# Patient Record
Sex: MALE | Race: WHITE | Employment: OTHER | URBAN - METROPOLITAN AREA
[De-identification: names, ages, dates, MRNs, and addresses within clinical notes are randomized per-mention and may not be internally consistent; named-entity substitution may affect disease eponyms.]

---

## 2017-11-16 ENCOUNTER — HOSPITAL ENCOUNTER (EMERGENCY)
Age: 72
Discharge: HOME | End: 2017-11-16
Attending: EMERGENCY MEDICINE
Payer: MEDICARE

## 2017-11-16 ENCOUNTER — APPOINTMENT (OUTPATIENT)
Dept: GENERAL RADIOLOGY | Age: 72
End: 2017-11-16
Attending: PHYSICIAN ASSISTANT
Payer: MEDICARE

## 2017-11-16 VITALS
RESPIRATION RATE: 16 BRPM | WEIGHT: 135 LBS | TEMPERATURE: 98 F | HEART RATE: 87 BPM | SYSTOLIC BLOOD PRESSURE: 149 MMHG | BODY MASS INDEX: 22.49 KG/M2 | DIASTOLIC BLOOD PRESSURE: 69 MMHG | HEIGHT: 65 IN | OXYGEN SATURATION: 100 %

## 2017-11-16 DIAGNOSIS — M10.9 GOUTY ARTHRITIS: Primary | ICD-10-CM

## 2017-11-16 LAB
ANION GAP SERPL CALC-SCNC: 8 MMOL/L (ref 3–18)
BASOPHILS # BLD: 0 K/UL (ref 0–0.06)
BASOPHILS NFR BLD: 0 % (ref 0–2)
BUN SERPL-MCNC: 33 MG/DL (ref 7–18)
BUN/CREAT SERPL: 21 (ref 12–20)
CALCIUM SERPL-MCNC: 8.6 MG/DL (ref 8.5–10.1)
CHLORIDE SERPL-SCNC: 105 MMOL/L (ref 100–108)
CO2 SERPL-SCNC: 29 MMOL/L (ref 21–32)
CREAT SERPL-MCNC: 1.56 MG/DL (ref 0.6–1.3)
DIFFERENTIAL METHOD BLD: ABNORMAL
EOSINOPHIL # BLD: 0.1 K/UL (ref 0–0.4)
EOSINOPHIL NFR BLD: 1 % (ref 0–5)
ERYTHROCYTE [DISTWIDTH] IN BLOOD BY AUTOMATED COUNT: 13.1 % (ref 11.6–14.5)
GLUCOSE SERPL-MCNC: 222 MG/DL (ref 74–99)
HCT VFR BLD AUTO: 33.8 % (ref 36–48)
HGB BLD-MCNC: 11.4 G/DL (ref 13–16)
LYMPHOCYTES # BLD: 0.9 K/UL (ref 0.9–3.6)
LYMPHOCYTES NFR BLD: 11 % (ref 21–52)
MCH RBC QN AUTO: 31.2 PG (ref 24–34)
MCHC RBC AUTO-ENTMCNC: 33.7 G/DL (ref 31–37)
MCV RBC AUTO: 92.6 FL (ref 74–97)
MONOCYTES # BLD: 1 K/UL (ref 0.05–1.2)
MONOCYTES NFR BLD: 12 % (ref 3–10)
NEUTS SEG # BLD: 6.1 K/UL (ref 1.8–8)
NEUTS SEG NFR BLD: 76 % (ref 40–73)
PLATELET # BLD AUTO: 209 K/UL (ref 135–420)
PMV BLD AUTO: 10 FL (ref 9.2–11.8)
POTASSIUM SERPL-SCNC: 5 MMOL/L (ref 3.5–5.5)
RBC # BLD AUTO: 3.65 M/UL (ref 4.7–5.5)
SODIUM SERPL-SCNC: 142 MMOL/L (ref 136–145)
URATE SERPL-MCNC: 6.2 MG/DL (ref 2.6–7.2)
WBC # BLD AUTO: 8 K/UL (ref 4.6–13.2)

## 2017-11-16 PROCEDURE — 80048 BASIC METABOLIC PNL TOTAL CA: CPT

## 2017-11-16 PROCEDURE — 73130 X-RAY EXAM OF HAND: CPT

## 2017-11-16 PROCEDURE — 74011250637 HC RX REV CODE- 250/637: Performed by: PHYSICIAN ASSISTANT

## 2017-11-16 PROCEDURE — 84550 ASSAY OF BLOOD/URIC ACID: CPT

## 2017-11-16 PROCEDURE — 99283 EMERGENCY DEPT VISIT LOW MDM: CPT

## 2017-11-16 PROCEDURE — 85025 COMPLETE CBC W/AUTO DIFF WBC: CPT

## 2017-11-16 RX ORDER — COLCHICINE 0.6 MG/1
0.6 TABLET ORAL DAILY
Qty: 7 TAB | Refills: 0 | Status: SHIPPED | OUTPATIENT
Start: 2017-11-16 | End: 2017-11-23

## 2017-11-16 RX ORDER — TRAMADOL HYDROCHLORIDE 50 MG/1
50 TABLET ORAL
Qty: 12 TAB | Refills: 0 | Status: SHIPPED | OUTPATIENT
Start: 2017-11-16 | End: 2019-02-01

## 2017-11-16 RX ORDER — NAPROXEN 250 MG/1
250 TABLET ORAL
Status: COMPLETED | OUTPATIENT
Start: 2017-11-16 | End: 2017-11-16

## 2017-11-16 RX ORDER — TRAZODONE HYDROCHLORIDE 50 MG/1
TABLET ORAL
COMMUNITY
End: 2019-02-01

## 2017-11-16 RX ORDER — COLCHICINE 0.6 MG/1
1.2 TABLET ORAL
Status: COMPLETED | OUTPATIENT
Start: 2017-11-16 | End: 2017-11-16

## 2017-11-16 RX ADMIN — NAPROXEN 250 MG: 250 TABLET ORAL at 17:04

## 2017-11-16 RX ADMIN — COLCHICINE 1.2 MG: 0.6 TABLET, FILM COATED ORAL at 17:04

## 2017-11-16 NOTE — DISCHARGE INSTRUCTIONS
Gout: Care Instructions  Your Care Instructions    Gout is a form of arthritis caused by a buildup of uric acid crystals in a joint. It causes sudden attacks of pain, swelling, redness, and stiffness, usually in one joint, especially the big toe. Gout usually comes on without a cause. But it can be brought on by drinking alcohol (especially beer) or eating seafood and red meat. Taking certain medicines, such as diuretics or aspirin, also can bring on an attack of gout. Taking your medicines as prescribed and following up with your doctor regularly can help you avoid gout attacks in the future. Follow-up care is a key part of your treatment and safety. Be sure to make and go to all appointments, and call your doctor if you are having problems. It's also a good idea to know your test results and keep a list of the medicines you take. How can you care for yourself at home? · If the joint is swollen, put ice or a cold pack on the area for 10 to 20 minutes at a time. Put a thin cloth between the ice and your skin. · Prop up the sore limb on a pillow when you ice it or anytime you sit or lie down during the next 3 days. Try to keep it above the level of your heart. This will help reduce swelling. · Rest sore joints. Avoid activities that put weight or strain on the joints for a few days. Take short rest breaks from your regular activities during the day. · Take your medicines exactly as prescribed. Call your doctor if you think you are having a problem with your medicine. · Take pain medicines exactly as directed. ¨ If the doctor gave you a prescription medicine for pain, take it as prescribed. ¨ If you are not taking a prescription pain medicine, ask your doctor if you can take an over-the-counter medicine. · Eat less seafood and red meat. · Check with your doctor before drinking alcohol. · Losing weight, if you are overweight, may help reduce attacks of gout. But do not go on a [employer]. \" Losing a lot of weight in a short amount of time can cause a gout attack. When should you call for help? Call your doctor now or seek immediate medical care if:  ? · You have a fever. ? · The joint is so painful you cannot use it. ? · You have sudden, unexplained swelling, redness, warmth, or severe pain in one or more joints. ? Watch closely for changes in your health, and be sure to contact your doctor if:  ? · You have joint pain. ? · Your symptoms get worse or are not improving after 2 or 3 days. Where can you learn more? Go to http://jonathan-ericka.info/. Enter S988 in the search box to learn more about \"Gout: Care Instructions. \"  Current as of: October 31, 2016  Content Version: 11.4  © 2081-4282 [employer]. Care instructions adapted under license by [employer] (which disclaims liability or warranty for this information). If you have questions about a medical condition or this instruction, always ask your healthcare professional. [place] [address] any warranty or liability for your use of this information. Purine-Restricted Diet: Care Instructions  Your Care Instructions    Purines are substances that are found in some foods. Your body turns purines into uric acid. High levels of uric acid can cause gout, which is a form of arthritis that causes pain and inflammation in joints. You may be able to help control the amount of uric acid in your body by limiting high-purine foods in your diet. Follow-up care is a key part of your treatment and safety. Be sure to make and go to all appointments, and call your doctor if you are having problems. It's also a good idea to know your test results and keep a list of the medicines you take. How can you care for yourself at home? · Plan your meals and snacks around foods that are low in purines and are safe for you to eat.  These foods include:  ¨ Green vegetables and tomatoes. ¨ Fruits. ¨ Whole-grain breads, rice, and cereals. ¨ Eggs, peanut butter, and nuts. ¨ Low-fat milk, cheese, and other milk products. ¨ Popcorn. ¨ Gelatin desserts, chocolate, cocoa, and cakes and sweets, in small amounts. · You can eat certain foods that are medium-high in purines, but eat them only once in a while. These foods include:  ¨ Legumes, such as dried beans and dried peas. You can have 1 cup cooked legumes each day. ¨ Asparagus, cauliflower, spinach, mushrooms, and green peas. ¨ Fish and seafood (other than very high-purine seafood). ¨ Oatmeal, wheat bran, and wheat germ. · Limit very high-purine foods, including:  ¨ Organ meats, such as liver, kidneys, sweetbreads, and brains. ¨ Meats, including jackson, beef, pork, and lamb. ¨ Game meats and any other meats in large amounts. ¨ Anchovies, sardines, herring, mackerel, and scallops. ¨ Gravy. ¨ Beer. Where can you learn more? Go to http://jonathan-ericka.info/. Enter F448 in the search box to learn more about \"Purine-Restricted Diet: Care Instructions. \"  Current as of: May 12, 2017  Content Version: 11.4  © 0589-8619 [employer]. Care instructions adapted under license by [employer] (which disclaims liability or warranty for this information). If you have questions about a medical condition or this instruction, always ask your healthcare professional. [place] [address] any warranty or liability for your use of this information.

## 2017-11-16 NOTE — ED PROVIDER NOTES
HPI Comments: Patient is a 75 y/o male w/ PMH HTN, Gout, Arthritis, BPH, DM, who presents to the ER c/o right hand pain and swelling. Patient states he woke up yesterday morning with some mild pain and stiffness in his right hand. He denied any recent falls or trauma. He reports the swelling has worsened and the pain continues with no improvement with taking Percocet and his arthritis medications at home. He rates his pain 6/10, and radiates into his right forearm. He has tried ice with no relief as well. He denied any previous DVT or clots in the past.  He denied any recent fevers, chills, headache, dizziness, SOB, chest pain, abd pain, nausea, vomiting, and has no other complaints. Patient is a 74 y.o. male presenting with hand pain. The history is provided by the patient. Hand Pain           Past Medical History:   Diagnosis Date    Arthritis     Back pain     Bladder stone     BPH with obstruction/lower urinary tract symptoms     Cancer ([facility] [address] 2013    skin cancer neck     Chronic pain     hip    Colon polyp     Diabetes ([facility] [address]     diet controlled and exercise    ED (erectile dysfunction)     Elevated PSA     Erectile dysfunction     Hypertension     Lower urinary tract symptoms (LUTS)     Nocturia     Osteoarthritis of left hip 12/19/2012    Osteoarthritis of right hip 2/1/2016    PONV (postoperative nausea and vomiting)     Urinary frequency        Past Surgical History:   Procedure Laterality Date    HX COLONOSCOPY  01/09/2017    HX HIP REPLACEMENT Left     HX HIP REPLACEMENT Right 2/2/16    HX KNEE ARTHROSCOPY Left 2005    HX LAP CHOLECYSTECTOMY  12/3/15    HX MOHS PROCEDURES Left 2009    HX UROLOGICAL  2007         History reviewed. No pertinent family history. Social History     Social History    Marital status:      Spouse name: N/A    Number of children: N/A    Years of education: N/A     Occupational History    Not on file.      Social History Main Topics  Smoking status: Never Smoker    Smokeless tobacco: Never Used    Alcohol use No    Drug use: No    Sexual activity: No     Other Topics Concern    Not on file     Social History Narrative         ALLERGIES: Other food    Review of Systems   Constitutional: Negative for chills, fatigue and fever. HENT: Negative. Negative for sore throat. Eyes: Negative. Respiratory: Negative for cough and shortness of breath. Cardiovascular: Negative for chest pain and palpitations. Gastrointestinal: Negative for abdominal pain, nausea and vomiting. Genitourinary: Negative for dysuria. Musculoskeletal: Positive for arthralgias. Right hand pain, swelling and redness   Skin: Negative. Neurological: Negative for dizziness, weakness, light-headedness and headaches. Psychiatric/Behavioral: Negative. All other systems reviewed and are negative. Vitals:    11/16/17 1430   BP: 149/69   Pulse: 87   Resp: 16   Temp: 98 °F (36.7 °C)   SpO2: 100%   Weight: 61.2 kg (135 lb)   Height: 5' 5\" (1.651 m)            Physical Exam   Constitutional: He is oriented to person, place, and time. He appears well-developed and well-nourished. No distress. HENT:   Head: Normocephalic and atraumatic. Mouth/Throat: Oropharynx is clear and moist.   Eyes: Conjunctivae are normal. No scleral icterus. Neck: Neck supple. No JVD present. No tracheal deviation present. Cardiovascular: Normal rate, regular rhythm and normal heart sounds. Pulmonary/Chest: Effort normal and breath sounds normal. No respiratory distress. He has no wheezes. Abdominal: Soft. There is no tenderness. Musculoskeletal: Normal range of motion. Hands:  Neurological: He is alert and oriented to person, place, and time. He has normal strength. Gait normal. GCS eye subscore is 4. GCS verbal subscore is 5. GCS motor subscore is 6. Skin: Skin is warm and dry. He is not diaphoretic. Psychiatric: He has a normal mood and affect. Nursing note and vitals reviewed. MDM  Number of Diagnoses or Management Options  Gouty arthritis:   Diagnosis management comments: 3:13 PM  75 y/o male c/o non-traumatic right hand pain onset yesterday morning. Previous hx of gout and arthritis in the past.  Will plan on labs, imaging. Mild swelling, warmth and tender to touch noted on exam.  Pt has taken percocet and other arthritis meds with no relief. Also tried ice with no relief. Harjinder Kim PA-C    5:12 PM  Xray right hand negative for acute abnormality. Labs unremarkable. Discussed all results with pt. Will have f/u with PCP and treat for suspected acute gout flare. All questions answered and patient in agreement with plan of care. Will plan for discharge.   Harjinder Kim PA-C      Clinical Impression:  Gouty Arthritis Right hand         Amount and/or Complexity of Data Reviewed  Clinical lab tests: ordered and reviewed  Tests in the radiology section of CPT®: ordered and reviewed    Risk of Complications, Morbidity, and/or Mortality  Presenting problems: moderate  Diagnostic procedures: moderate  Management options: moderate    Patient Progress  Patient progress: stable    ED Course       Procedures           Vitals:  Patient Vitals for the past 12 hrs:   Temp Pulse Resp BP SpO2   11/16/17 1430 98 °F (36.7 °C) 87 16 149/69 100 %         Medications ordered:   Medications   colchicine tablet 1.2 mg (1.2 mg Oral Given 11/16/17 1704)   naproxen (NAPROSYN) tablet 250 mg (250 mg Oral Given 11/16/17 1704)         Lab findings:  Recent Results (from the past 12 hour(s))   CBC WITH AUTOMATED DIFF    Collection Time: 11/16/17  3:10 PM   Result Value Ref Range    WBC 8.0 4.6 - 13.2 K/uL    RBC 3.65 (L) 4.70 - 5.50 M/uL    HGB 11.4 (L) 13.0 - 16.0 g/dL    HCT 33.8 (L) 36.0 - 48.0 %    MCV 92.6 74.0 - 97.0 FL    MCH 31.2 24.0 - 34.0 PG    MCHC 33.7 31.0 - 37.0 g/dL    RDW 13.1 11.6 - 14.5 %    PLATELET 469 134 - 248 K/uL    MPV 10.0 9.2 - 11.8 FL NEUTROPHILS 76 (H) 40 - 73 %    LYMPHOCYTES 11 (L) 21 - 52 %    MONOCYTES 12 (H) 3 - 10 %    EOSINOPHILS 1 0 - 5 %    BASOPHILS 0 0 - 2 %    ABS. NEUTROPHILS 6.1 1.8 - 8.0 K/UL    ABS. LYMPHOCYTES 0.9 0.9 - 3.6 K/UL    ABS. MONOCYTES 1.0 0.05 - 1.2 K/UL    ABS. EOSINOPHILS 0.1 0.0 - 0.4 K/UL    ABS. BASOPHILS 0.0 0.0 - 0.06 K/UL    DF AUTOMATED     METABOLIC PANEL, BASIC    Collection Time: 11/16/17  3:10 PM   Result Value Ref Range    Sodium 142 136 - 145 mmol/L    Potassium 5.0 3.5 - 5.5 mmol/L    Chloride 105 100 - 108 mmol/L    CO2 29 21 - 32 mmol/L    Anion gap 8 3.0 - 18 mmol/L    Glucose 222 (H) 74 - 99 mg/dL    BUN 33 (H) 7.0 - 18 MG/DL    Creatinine 1.56 (H) 0.6 - 1.3 MG/DL    BUN/Creatinine ratio 21 (H) 12 - 20      GFR est AA 53 (L) >60 ml/min/1.73m2    GFR est non-AA 44 (L) >60 ml/min/1.73m2    Calcium 8.6 8.5 - 10.1 MG/DL   URIC ACID    Collection Time: 11/16/17  3:10 PM   Result Value Ref Range    Uric acid 6.2 2.6 - 7.2 MG/DL       EKG interpretation by ED Physician:      X-Ray, CT or other radiology findings or impressions:  XR HAND RT MIN 3 V    (Results Pending)       Progress notes, Consult notes or additional Procedure notes:       Reevaluation of patient:       Disposition:  Diagnosis:   1. Gouty arthritis        Disposition:     Follow-up Information     Follow up With Details Comments Contact Info    [facility] EMERGENCY DEPT  If symptoms worsen [address]    Thu Monteiro MD Call in 2 days ER follow up [place] [phone]             Patient's Medications   Start Taking    COLCHICINE 0.6 MG TABLET    Take 1 Tab by mouth daily for 7 days. Indications: acute gouty arthritis    TRAMADOL (ULTRAM) 50 MG TABLET    Take 1 Tab by mouth every eight (8) hours as needed for Pain. Max Daily Amount: 150 mg. Continue Taking    AMLODIPINE (NORVASC) 5 MG TABLET    Take 5 mg by mouth daily.     MULTIVITAMIN (ONE A DAY) TABLET    Take 1 Tab by mouth daily. TAMSULOSIN (FLOMAX) 0.4 MG CAPSULE    TAKE 2 CAPSULES BY MOUTH DAILY    TRAZODONE (DESYREL) 50 MG TABLET    Take  by mouth nightly.    These Medications have changed    No medications on file   Stop Taking    No medications on file

## 2017-12-22 ENCOUNTER — HOSPITAL ENCOUNTER (OUTPATIENT)
Dept: LAB | Age: 72
Discharge: HOME | End: 2017-12-22
Payer: MEDICARE

## 2017-12-22 ENCOUNTER — HOSPITAL ENCOUNTER (OUTPATIENT)
Dept: GENERAL RADIOLOGY | Age: 72
Discharge: HOME | End: 2017-12-22
Payer: MEDICARE

## 2017-12-22 DIAGNOSIS — N13.8 BENIGN PROSTATIC HYPERPLASIA WITH URINARY OBSTRUCTION AND OTHER LOWER URINARY TRACT SYMPTOMS: ICD-10-CM

## 2017-12-22 DIAGNOSIS — N40.1 BENIGN PROSTATIC HYPERPLASIA WITH URINARY OBSTRUCTION AND OTHER LOWER URINARY TRACT SYMPTOMS: ICD-10-CM

## 2017-12-22 LAB
ATRIAL RATE: 64 BPM
CALCULATED P AXIS, ECG09: 71 DEGREES
CALCULATED R AXIS, ECG10: 53 DEGREES
CALCULATED T AXIS, ECG11: 66 DEGREES
DIAGNOSIS, 93000: NORMAL
P-R INTERVAL, ECG05: 152 MS
Q-T INTERVAL, ECG07: 382 MS
QRS DURATION, ECG06: 94 MS
QTC CALCULATION (BEZET), ECG08: 394 MS
VENTRICULAR RATE, ECG03: 64 BPM

## 2017-12-22 PROCEDURE — 93005 ELECTROCARDIOGRAM TRACING: CPT

## 2017-12-22 PROCEDURE — 71020 XR CHEST PA LAT: CPT

## 2020-06-06 ENCOUNTER — HOSPITAL ENCOUNTER (EMERGENCY)
Age: 75
Discharge: HOME | End: 2020-06-06
Attending: EMERGENCY MEDICINE
Payer: MEDICARE

## 2020-06-06 ENCOUNTER — APPOINTMENT (OUTPATIENT)
Dept: GENERAL RADIOLOGY | Age: 75
End: 2020-06-06
Attending: PHYSICIAN ASSISTANT
Payer: MEDICARE

## 2020-06-06 VITALS
HEART RATE: 72 BPM | BODY MASS INDEX: 21.66 KG/M2 | RESPIRATION RATE: 16 BRPM | OXYGEN SATURATION: 98 % | SYSTOLIC BLOOD PRESSURE: 162 MMHG | TEMPERATURE: 97.8 F | WEIGHT: 130 LBS | DIASTOLIC BLOOD PRESSURE: 79 MMHG | HEIGHT: 65 IN

## 2020-06-06 DIAGNOSIS — M79.89 REDNESS AND SWELLING OF HAND: ICD-10-CM

## 2020-06-06 DIAGNOSIS — R23.8 REDNESS AND SWELLING OF HAND: ICD-10-CM

## 2020-06-06 DIAGNOSIS — M19.049 HAND ARTHRITIS: Primary | ICD-10-CM

## 2020-06-06 LAB
ANION GAP SERPL CALC-SCNC: 5 MMOL/L (ref 3–18)
BASOPHILS # BLD: 0 K/UL (ref 0–0.1)
BASOPHILS NFR BLD: 0 % (ref 0–2)
BUN SERPL-MCNC: 26 MG/DL (ref 7–18)
BUN/CREAT SERPL: 18 (ref 12–20)
CALCIUM SERPL-MCNC: 8.3 MG/DL (ref 8.5–10.1)
CHLORIDE SERPL-SCNC: 109 MMOL/L (ref 100–111)
CO2 SERPL-SCNC: 25 MMOL/L (ref 21–32)
CREAT SERPL-MCNC: 1.42 MG/DL (ref 0.6–1.3)
DIFFERENTIAL METHOD BLD: ABNORMAL
EOSINOPHIL # BLD: 0.1 K/UL (ref 0–0.4)
EOSINOPHIL NFR BLD: 1 % (ref 0–5)
ERYTHROCYTE [DISTWIDTH] IN BLOOD BY AUTOMATED COUNT: 12.9 % (ref 11.6–14.5)
GLUCOSE SERPL-MCNC: 207 MG/DL (ref 74–99)
HCT VFR BLD AUTO: 39.3 % (ref 36–48)
HGB BLD-MCNC: 13.2 G/DL (ref 13–16)
LYMPHOCYTES # BLD: 0.9 K/UL (ref 0.9–3.6)
LYMPHOCYTES NFR BLD: 12 % (ref 21–52)
MCH RBC QN AUTO: 31.5 PG (ref 24–34)
MCHC RBC AUTO-ENTMCNC: 33.6 G/DL (ref 31–37)
MCV RBC AUTO: 93.8 FL (ref 74–97)
MONOCYTES # BLD: 0.7 K/UL (ref 0.05–1.2)
MONOCYTES NFR BLD: 9 % (ref 3–10)
NEUTS SEG # BLD: 5.5 K/UL (ref 1.8–8)
NEUTS SEG NFR BLD: 78 % (ref 40–73)
PLATELET # BLD AUTO: 195 K/UL (ref 135–420)
PMV BLD AUTO: 10.4 FL (ref 9.2–11.8)
POTASSIUM SERPL-SCNC: 3.7 MMOL/L (ref 3.5–5.5)
RBC # BLD AUTO: 4.19 M/UL (ref 4.7–5.5)
SODIUM SERPL-SCNC: 139 MMOL/L (ref 136–145)
URATE SERPL-MCNC: 5.8 MG/DL (ref 2.6–7.2)
WBC # BLD AUTO: 7.1 K/UL (ref 4.6–13.2)

## 2020-06-06 PROCEDURE — 85025 COMPLETE CBC W/AUTO DIFF WBC: CPT

## 2020-06-06 PROCEDURE — 99282 EMERGENCY DEPT VISIT SF MDM: CPT

## 2020-06-06 PROCEDURE — 84550 ASSAY OF BLOOD/URIC ACID: CPT

## 2020-06-06 PROCEDURE — 80048 BASIC METABOLIC PNL TOTAL CA: CPT

## 2020-06-06 PROCEDURE — 73130 X-RAY EXAM OF HAND: CPT

## 2020-06-06 RX ORDER — CEPHALEXIN 500 MG/1
500 CAPSULE ORAL 4 TIMES DAILY
Qty: 28 CAP | Refills: 0 | Status: SHIPPED | OUTPATIENT
Start: 2020-06-06 | End: 2020-06-13

## 2020-06-06 RX ORDER — ACETAMINOPHEN 325 MG/1
650 TABLET ORAL
Qty: 20 TAB | Refills: 0 | Status: SHIPPED | OUTPATIENT
Start: 2020-06-06 | End: 2021-01-12

## 2020-06-06 RX ORDER — CEPHALEXIN 500 MG/1
500 CAPSULE ORAL 4 TIMES DAILY
Qty: 28 CAP | Refills: 0 | Status: SHIPPED | OUTPATIENT
Start: 2020-06-06 | End: 2020-06-06

## 2020-06-06 RX ORDER — ACETAMINOPHEN 325 MG/1
650 TABLET ORAL
Qty: 20 TAB | Refills: 0 | Status: SHIPPED | OUTPATIENT
Start: 2020-06-06 | End: 2020-06-06

## 2020-06-06 NOTE — ED TRIAGE NOTES
Left hand swelling. Started with middle finger that could not be straightened and swelling started x4 days. Unsure of injury or trauma.

## 2020-06-06 NOTE — ED PROVIDER NOTES
EMERGENCY DEPARTMENT HISTORY AND PHYSICAL EXAM    Date: 6/6/2020  Patient Name: Doreen Yun    History of Presenting Illness     Chief Complaint   Patient presents with    Hand Swelling         History Provided By:patient    Chief Complaint: hand pain and swelling  Duration:3 days  Timing:  acute  Location: L hand   Quality tightness  Severity:moderate  Modifying Factors: worse with movement, ice did not help  Associated Symptoms: hand swelling and redness      Additional History (Context): Doreen Yun is a 66 y.o. male with past medical history arthritis, diabetes, BPH, hypertension, and gout who presents with c/o atraumatic L hand swelling and redness that began 3 days ago. Patient states he has some slight pain and discomfort in the hand. 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 Patient says he is used ice with no relief in his swelling. No other complaints reported at this time. PCP: Holli Romero MD    Current Outpatient Medications   Medication Sig Dispense Refill    cephALEXin (Keflex) 500 mg capsule Take 1 Cap by mouth four (4) times daily for 7 days. 28 Cap 0    acetaminophen (TYLENOL) 325 mg tablet Take 2 Tabs by mouth every four (4) hours as needed for Pain. 20 Tab 0    lisinopril (PRINIVIL, ZESTRIL) 10 mg tablet Take  by mouth daily.  cinnamon bark (CINNAMON) 500 mg cap Take  by mouth.  b complex vitamins tablet Take 1 Tab by mouth daily. Past History     Past Medical History:  Past Medical History:   Diagnosis Date    Arthritis     Back pain     Bladder stone     BPH with obstruction/lower urinary tract symptoms     Cancer ([facility] [address] 2013    skin cancer neck     Chronic pain     hip    Colon polyp     Diabetes ([facility] [address]     diet controlled and exercise    ED (erectile dysfunction)     Elevated PSA     Erectile dysfunction     Gout     States in his hand and recently on medication.     Hydrocele     Hypertension     Lower urinary tract symptoms (LUTS)     Nocturia     Osteoarthritis of left hip 12/19/2012    Osteoarthritis of right hip 2/1/2016    PONV (postoperative nausea and vomiting)     Urinary frequency     UTI (urinary tract infection)     States he is on medication and does not remember the name. He is going to contact Dr. Andressa Noland for questions. Past Surgical History:  Past Surgical History:   Procedure Laterality Date    HX COLONOSCOPY  01/09/2017    HX HIP REPLACEMENT Left     HX HIP REPLACEMENT Right 2/2/16    HX KNEE ARTHROSCOPY Left 2005    HX LAP CHOLECYSTECTOMY  12/3/15    HX MOHS PROCEDURES Left 2009    HX UROLOGICAL  07/24/2007    [facility]Bx. Benign.  HX UROLOGICAL  01/02/2018    [facility]. Cystoscopy with Large Bladder stones Cystolithopaxy and PVP. Dr. Claudette Nani. Family History:  No family history on file. Social History:  Social History     Tobacco Use    Smoking status: Never Smoker    Smokeless tobacco: Never Used   Substance Use Topics    Alcohol use: No    Drug use: No       Allergies: Allergies   Allergen Reactions    Other Food Runny Nose     Mono sodium gluconate    Oxycodone-Acetaminophen Other (comments)     Other reaction(s): gi distress         Review of Systems   Review of Systems   Constitutional: Negative. Negative for chills and fever. HENT: Negative. Negative for congestion, ear pain and rhinorrhea. Eyes: Negative. Negative for pain and redness. Respiratory: Negative. Negative for cough, shortness of breath, wheezing and stridor. Cardiovascular: Negative. Negative for chest pain and leg swelling. Gastrointestinal: Negative. Negative for abdominal pain, constipation, diarrhea, nausea and vomiting. Genitourinary: Negative. Negative for dysuria and frequency. Musculoskeletal: Positive for arthralgias and joint swelling. Negative for back pain and neck pain. Skin: Positive for color change.  Negative for rash and wound.   Neurological: Negative. Negative for dizziness, seizures, syncope and headaches. All other systems reviewed and are negative. All Other Systems Negative  Physical Exam     Vitals:    06/06/20 1118   BP: 162/79   Pulse: 72   Resp: 16   Temp: 97.8 °F (36.6 °C)   SpO2: 98%   Weight: 59 kg (130 lb)   Height: 5' 5\" (1.651 m)     Physical Exam  Vitals signs and nursing note reviewed. Constitutional:       General: He is not in acute distress. Appearance: He is well-developed. He is not diaphoretic. HENT:      Head: Normocephalic and atraumatic. Eyes:      General: No scleral icterus. Right eye: No discharge. Left eye: No discharge. Conjunctiva/sclera: Conjunctivae normal.   Neck:      Musculoskeletal: Normal range of motion and neck supple. Cardiovascular:      Rate and Rhythm: Normal rate and regular rhythm. Heart sounds: Normal heart sounds. No murmur. No friction rub. No gallop. Pulmonary:      Effort: Pulmonary effort is normal. No respiratory distress. Breath sounds: Normal breath sounds. No stridor. No wheezing or rales. Musculoskeletal: Normal range of motion. General: Swelling and tenderness present. No deformity. Comments: LUE: intact pulses, cap RF < 3 sec. Moderate amount of edema and hyperpigmentation noted across the dorsum of the hand with TTP noted over the 2nd 3rd and 4th MCP joints. Limited ROM upon extension of the 2nd 3rd and 4th fingers secondary to pain and swelling. No hand warmth noted on palpation. Skin:     General: Skin is warm and dry. Findings: No rash. Neurological:      Mental Status: He is alert and oriented to person, place, and time. Coordination: Coordination normal.      Comments: Gait is steady and patient exhibits no evidence of ataxia. Patient is able to ambulate without difficulty. No focal neurological deficit noted.  No facial droop, slurred speech, or evidence of altered mentation noted on exam.     Psychiatric:         Behavior: Behavior normal.         Thought Content: Thought content normal.                Diagnostic Study Results     Labs -     Recent Results (from the past 12 hour(s))   CBC WITH AUTOMATED DIFF    Collection Time: 06/06/20 11:45 AM   Result Value Ref Range    WBC 7.1 4.6 - 13.2 K/uL    RBC 4.19 (L) 4.70 - 5.50 M/uL    HGB 13.2 13.0 - 16.0 g/dL    HCT 39.3 36.0 - 48.0 %    MCV 93.8 74.0 - 97.0 FL    MCH 31.5 24.0 - 34.0 PG    MCHC 33.6 31.0 - 37.0 g/dL    RDW 12.9 11.6 - 14.5 %    PLATELET 510 542 - 616 K/uL    MPV 10.4 9.2 - 11.8 FL    NEUTROPHILS 78 (H) 40 - 73 %    LYMPHOCYTES 12 (L) 21 - 52 %    MONOCYTES 9 3 - 10 %    EOSINOPHILS 1 0 - 5 %    BASOPHILS 0 0 - 2 %    ABS. NEUTROPHILS 5.5 1.8 - 8.0 K/UL    ABS. LYMPHOCYTES 0.9 0.9 - 3.6 K/UL    ABS. MONOCYTES 0.7 0.05 - 1.2 K/UL    ABS. EOSINOPHILS 0.1 0.0 - 0.4 K/UL    ABS. BASOPHILS 0.0 0.0 - 0.1 K/UL    DF AUTOMATED     METABOLIC PANEL, BASIC    Collection Time: 06/06/20 11:45 AM   Result Value Ref Range    Sodium 139 136 - 145 mmol/L    Potassium 3.7 3.5 - 5.5 mmol/L    Chloride 109 100 - 111 mmol/L    CO2 25 21 - 32 mmol/L    Anion gap 5 3.0 - 18 mmol/L    Glucose 207 (H) 74 - 99 mg/dL    BUN 26 (H) 7.0 - 18 MG/DL    Creatinine 1.42 (H) 0.6 - 1.3 MG/DL    BUN/Creatinine ratio 18 12 - 20      GFR est AA 59 (L) >60 ml/min/1.73m2    GFR est non-AA 48 (L) >60 ml/min/1.73m2    Calcium 8.3 (L) 8.5 - 10.1 MG/DL   URIC ACID    Collection Time: 06/06/20 11:45 AM   Result Value Ref Range    Uric acid 5.8 2.6 - 7.2 MG/DL       Radiologic Studies -   XR HAND LT MIN 3 V    (Results Pending)   DJD without acute process or evidence of OM   CT Results  (Last 48 hours)    None        CXR Results  (Last 48 hours)    None            Medical Decision Making   I am the first provider for this patient. I reviewed the vital signs, available nursing notes, past medical history, past surgical history, family history and social history.     Vital Signs-Reviewed the patient's vital signs. Records Reviewed: Angelia Armstrong PA-C     Procedures:  Procedures    Provider Notes (Medical Decision Making): Impression:  Arthritis, hand swelling, hand redness    IV inserted, labs show a normal WBC, Cr 1.42, BUN 26, glucose 207, uric aci 5.8. Patient's abnormal labs are stable and chronic. Clinical presentation is more suggestive of arthritis/gout of her cellulitis, however the patient does have some redness overlying the hand. Patient is afebrile and his vitals are stable. He has normal white blood cell count. We will plan to cover the patient with Keflex    MED RECONCILIATION:  No current facility-administered medications for this encounter. Current Outpatient Medications   Medication Sig    cephALEXin (Keflex) 500 mg capsule Take 1 Cap by mouth four (4) times daily for 7 days.  acetaminophen (TYLENOL) 325 mg tablet Take 2 Tabs by mouth every four (4) hours as needed for Pain.  lisinopril (PRINIVIL, ZESTRIL) 10 mg tablet Take  by mouth daily.  cinnamon bark (CINNAMON) 500 mg cap Take  by mouth.  b complex vitamins tablet Take 1 Tab by mouth daily. Disposition:  D/c    DISCHARGE NOTE:   Patient is stable for discharge at this time. I have discussed all the findings from today's work up with the patient, including lab results and imaging. I have answered all questions. Rx for keflex and tylenol  given. Rest and close follow-up with the PCP recommended this week. Return to the ED immediately for any new or worsening symptoms.   Angelia Hardwick PA-C   Follow-up Information     Follow up With Specialties Details Why Contact Info    Caity Nelson MD [facility] In 1 week  [place] [address]  [phone]      [facility] EMERGENCY DEPT Emergency Medicine  As needed, If symptoms worsen [address]  [phone]          Current Discharge Medication List      START taking these medications    Details   cephALEXin (Keflex) 500 mg capsule Take 1 Cap by mouth four (4) times daily for 7 days. Qty: 28 Cap, Refills: 0      acetaminophen (TYLENOL) 325 mg tablet Take 2 Tabs by mouth every four (4) hours as needed for Pain. Qty: 20 Tab, Refills: 0                 Diagnosis     Clinical Impression:   1. Hand arthritis    2.  Redness and swelling of hand

## 2020-06-06 NOTE — DISCHARGE INSTRUCTIONS
Patient Education        Arthritis: Care Instructions  Overview  Arthritis, also called osteoarthritis, is a breakdown of the cartilage that cushions your joints. When the cartilage wears down, your bones rub against each other. This causes pain and stiffness. Many people have some arthritis as they age. Arthritis most often affects the joints of the spine, hands, hips, knees, or feet. Arthritis never goes away completely. But medicine and home treatment can help reduce pain and help you stay active. Follow-up care is a key part of your treatment and safety. Be sure to make and go to all appointments, and call your doctor if you are having problems. It's also a good idea to know your test results and keep a list of the medicines you take. How can you care for yourself at home? · Stay at a healthy weight. Being overweight puts extra strain on your joints. · Talk to your doctor or physical therapist about exercises that will help ease joint pain. ? Stretch. You may enjoy gentle forms of yoga to help keep your joints and muscles flexible. ? Walk instead of jog. Other types of exercise that are less stressful on the joints include riding a bike, swimming, dick chi, or water exercise. ? Lift weights. Strong muscles help reduce stress on your joints. Stronger thigh muscles, for example, take some of the stress off of the knees and hips. Learn the right way to lift weights so you don't make joint pain worse. · Take your medicines exactly as prescribed. Call your doctor if you think you are having a problem with your medicine. · Take pain medicines exactly as directed. ? If the doctor gave you a prescription medicine for pain, take it as prescribed. ? If you are not taking a prescription pain medicine, ask your doctor if you can take an over-the-counter medicine. · Use a cane, crutch, walker, or another device if you need help to get around. These can help rest your joints.  You also can use other things to make life easier, such as a higher toilet seat and padded handles on kitchen utensils. · Do not sit in low chairs. They can make it hard to get up. · Put heat or cold on your sore joints as needed. Use whichever helps you most. You can also switch between hot and cold packs. ? Apply heat 2 or 3 times a day for 20 to 30 minutes--using a heating pad, hot shower, or hot pack--to relieve pain and stiffness. But don't use heat on a swollen joint. ? Put ice or a cold pack on your sore joint for 10 to 20 minutes at a time. Put a thin cloth between the ice and your skin. When should you call for help? Call your doctor now or seek immediate medical care if:  · You have sudden swelling, warmth, or pain in any joint. · You have joint pain and a fever or rash. · You have such bad pain that you cannot use a joint. Watch closely for changes in your health, and be sure to contact your doctor if:  · You have mild joint symptoms that continue even with more than 6 weeks of care at home. · You have stomach pain or other problems with your medicine. Where can you learn more? Go to [email or web address].info/  Enter M207 in the search box to learn more about \"Arthritis: Care Instructions. \"  Current as of: December 9, 2019               Content Version: 12.5  © 0478-0382 [employer]. Care instructions adapted under license by [employer] (which disclaims liability or warranty for this information). If you have questions about a medical condition or this instruction, always ask your healthcare professional. [address] any warranty or liability for your use of this information. [employer] Activation    Thank you for requesting access to [employer]. Please follow the instructions below to securely access and download your online medical record.  [employer] allows you to send messages to your doctor, view your test results, renew your prescriptions, schedule appointments, and more. How Do I Sign Up? 1. In your internet browser, go to [email or web address]  2. Click on the First Time User? Click Here link in the Sign In box. You will be redirect to the New Member Sign Up page. 3. Enter your FNZ Access Code exactly as it appears below. You will not need to use this code after youve completed the sign-up process. If you do not sign up before the expiration date, you must request a new code. MyChart Access Code: Activation code not generated  Current FNZ Status: Active (This is the date your [employer] access code will )    4. Enter the last four digits of your Social Security Number (xxxx) and Date of Birth (mm/dd/yyyy) as indicated and click Submit. You will be taken to the next sign-up page. 5. Create a [employer] ID. This will be your FNZ login ID and cannot be changed, so think of one that is secure and easy to remember. 6. Create a FNZ password. You can change your password at any time. 7. Enter your Password Reset Question and Answer. This can be used at a later time if you forget your password. 8. Enter your e-mail address. You will receive e-mail notification when new information is available in [address] 9. Click Sign Up. You can now view and download portions of your medical record. 10. Click the Download Summary menu link to download a portable copy of your medical information. Additional Information    If you have questions, please visit the Frequently Asked Questions section of the FNZ website at [email or web address] Remember, FNZ is NOT to be used for urgent needs. For medical emergencies, dial 911.

## 2021-10-06 ENCOUNTER — HOSPITAL ENCOUNTER (OUTPATIENT)
Dept: PREADMISSION TESTING | Age: 76
Discharge: HOME | End: 2021-10-06
Payer: MEDICARE

## 2021-10-06 ENCOUNTER — TRANSCRIBE ORDER (OUTPATIENT)
Dept: REGISTRATION | Age: 76
End: 2021-10-06

## 2021-10-06 DIAGNOSIS — M17.12 DEGENERATIVE ARTHRITIS OF LEFT KNEE: ICD-10-CM

## 2021-10-06 DIAGNOSIS — M17.12 DEGENERATIVE ARTHRITIS OF LEFT KNEE: Primary | ICD-10-CM

## 2021-10-06 LAB
ALBUMIN SERPL-MCNC: 3.6 G/DL (ref 3.4–5)
ALBUMIN/GLOB SERPL: 1.1 {RATIO} (ref 0.8–1.7)
ALP SERPL-CCNC: 86 U/L (ref 45–117)
ALT SERPL-CCNC: 17 U/L (ref 16–61)
ANION GAP SERPL CALC-SCNC: 5 MMOL/L (ref 3–18)
APPEARANCE UR: CLEAR
APTT PPP: 28 SEC (ref 23–36.4)
AST SERPL-CCNC: 16 U/L (ref 10–38)
ATRIAL RATE: 62 BPM
BACTERIA URNS QL MICRO: NEGATIVE /HPF
BASOPHILS # BLD: 0 K/UL (ref 0–0.1)
BASOPHILS NFR BLD: 0 % (ref 0–2)
BILIRUB SERPL-MCNC: 0.3 MG/DL (ref 0.2–1)
BILIRUB UR QL: NEGATIVE
BUN SERPL-MCNC: 31 MG/DL (ref 7–18)
BUN/CREAT SERPL: 21 (ref 12–20)
CALCIUM SERPL-MCNC: 9.1 MG/DL (ref 8.5–10.1)
CALCULATED P AXIS, ECG09: 82 DEGREES
CALCULATED R AXIS, ECG10: 67 DEGREES
CALCULATED T AXIS, ECG11: 64 DEGREES
CHLORIDE SERPL-SCNC: 107 MMOL/L (ref 100–111)
CO2 SERPL-SCNC: 28 MMOL/L (ref 21–32)
COLOR UR: YELLOW
CREAT SERPL-MCNC: 1.48 MG/DL (ref 0.6–1.3)
DIAGNOSIS, 93000: NORMAL
DIFFERENTIAL METHOD BLD: ABNORMAL
EOSINOPHIL # BLD: 0.1 K/UL (ref 0–0.4)
EOSINOPHIL NFR BLD: 1 % (ref 0–5)
EPITH CASTS URNS QL MICRO: NORMAL /LPF (ref 0–5)
ERYTHROCYTE [DISTWIDTH] IN BLOOD BY AUTOMATED COUNT: 13.6 % (ref 11.6–14.5)
ERYTHROCYTE [SEDIMENTATION RATE] IN BLOOD: 21 MM/HR (ref 0–20)
EST. AVERAGE GLUCOSE BLD GHB EST-MCNC: 140 MG/DL
GLOBULIN SER CALC-MCNC: 3.3 G/DL (ref 2–4)
GLUCOSE SERPL-MCNC: 87 MG/DL (ref 74–99)
GLUCOSE UR STRIP.AUTO-MCNC: NEGATIVE MG/DL
HBA1C MFR BLD: 6.5 % (ref 4.2–5.6)
HCT VFR BLD AUTO: 39.5 % (ref 36–48)
HGB BLD-MCNC: 12.8 G/DL (ref 13–16)
HGB UR QL STRIP: NEGATIVE
INR PPP: 1.3 (ref 0.8–1.2)
KETONES UR QL STRIP.AUTO: NEGATIVE MG/DL
LEUKOCYTE ESTERASE UR QL STRIP.AUTO: NEGATIVE
LYMPHOCYTES # BLD: 1 K/UL (ref 0.9–3.6)
LYMPHOCYTES NFR BLD: 13 % (ref 21–52)
MCH RBC QN AUTO: 31 PG (ref 24–34)
MCHC RBC AUTO-ENTMCNC: 32.4 G/DL (ref 31–37)
MCV RBC AUTO: 95.6 FL (ref 78–100)
MONOCYTES # BLD: 0.7 K/UL (ref 0.05–1.2)
MONOCYTES NFR BLD: 9 % (ref 3–10)
NEUTS SEG # BLD: 6 K/UL (ref 1.8–8)
NEUTS SEG NFR BLD: 75 % (ref 40–73)
NITRITE UR QL STRIP.AUTO: NEGATIVE
P-R INTERVAL, ECG05: 156 MS
PH UR STRIP: 5 [PH] (ref 5–8)
PLATELET # BLD AUTO: 234 K/UL (ref 135–420)
PMV BLD AUTO: 10.2 FL (ref 9.2–11.8)
POTASSIUM SERPL-SCNC: 4.2 MMOL/L (ref 3.5–5.5)
PROT SERPL-MCNC: 6.9 G/DL (ref 6.4–8.2)
PROT UR STRIP-MCNC: 30 MG/DL
PROTHROMBIN TIME: 15.6 SEC (ref 11.5–15.2)
Q-T INTERVAL, ECG07: 418 MS
QRS DURATION, ECG06: 94 MS
QTC CALCULATION (BEZET), ECG08: 424 MS
RBC # BLD AUTO: 4.13 M/UL (ref 4.35–5.65)
RBC #/AREA URNS HPF: NEGATIVE /HPF (ref 0–5)
SODIUM SERPL-SCNC: 140 MMOL/L (ref 136–145)
SP GR UR REFRACTOMETRY: 1.02 (ref 1–1.03)
UROBILINOGEN UR QL STRIP.AUTO: 1 EU/DL (ref 0.2–1)
VENTRICULAR RATE, ECG03: 62 BPM
WBC # BLD AUTO: 7.9 K/UL (ref 4.6–13.2)
WBC URNS QL MICRO: NORMAL /HPF (ref 0–5)

## 2021-10-06 PROCEDURE — 85730 THROMBOPLASTIN TIME PARTIAL: CPT

## 2021-10-06 PROCEDURE — 83036 HEMOGLOBIN GLYCOSYLATED A1C: CPT

## 2021-10-06 PROCEDURE — 93005 ELECTROCARDIOGRAM TRACING: CPT

## 2021-10-06 PROCEDURE — 85025 COMPLETE CBC W/AUTO DIFF WBC: CPT

## 2021-10-06 PROCEDURE — 85652 RBC SED RATE AUTOMATED: CPT

## 2021-10-06 PROCEDURE — 85610 PROTHROMBIN TIME: CPT

## 2021-10-06 PROCEDURE — 87086 URINE CULTURE/COLONY COUNT: CPT

## 2021-10-06 PROCEDURE — 81001 URINALYSIS AUTO W/SCOPE: CPT

## 2021-10-06 PROCEDURE — 36415 COLL VENOUS BLD VENIPUNCTURE: CPT

## 2021-10-06 PROCEDURE — 80053 COMPREHEN METABOLIC PANEL: CPT

## 2021-10-07 LAB
BACTERIA SPEC CULT: NORMAL
SERVICE CMNT-IMP: NORMAL

## 2021-10-09 LAB
BACTERIA SPEC CULT: NORMAL
BACTERIA SPEC CULT: NORMAL
SERVICE CMNT-IMP: NORMAL

## 2021-10-14 ENCOUNTER — HOSPITAL ENCOUNTER (OUTPATIENT)
Dept: PREADMISSION TESTING | Age: 76
Discharge: HOME | End: 2021-10-14

## 2021-10-14 VITALS — WEIGHT: 129 LBS | HEIGHT: 65 IN | BODY MASS INDEX: 21.49 KG/M2

## 2021-10-14 RX ORDER — GLIMEPIRIDE 1 MG/1
0.5 TABLET ORAL
COMMUNITY

## 2021-10-14 NOTE — PERIOP NOTES
PAT - SURGICAL PRE-ADMISSION INSTRUCTIONS    NAME:  Inessa Patel                                                          TODAY'S DATE:  10/14/2021    SURGERY DATE:  11/2/2021                                  SURGERY ARRIVAL TIME:   tba    1. Do NOT eat or drink anything, including candy or gum, after MIDNIGHT on 11/1/21 , unless you have specific instructions from your Surgeon or Anesthesia Provider to do so. 2. No smoking 24 hours before surgery. 3. No alcohol 24 hours prior to the day of surgery. 4. No recreational drugs for one week prior to the day of surgery. 5. Leave all valuables, including money/purse, at home. 6. Remove all jewelry, nail polish, makeup (including mascara); no lotions, powders, deodorant, or perfume/cologne/after shave. 7. Glasses/Contact lenses and Dentures may be worn to the hospital.  They will be removed prior to surgery. 8. Call your doctor if symptoms of a cold or illness develop within 24 ours prior to surgery. 9. AN ADULT MUST DRIVE YOU HOME AFTER OUTPATIENT SURGERY. 10. If you are having an OUTPATIENT procedure, please make arrangements for a responsible adult to be with you for 24 hours after your surgery. 11. If you are admitted to the hospital, you will be assigned to a bed after surgery is complete. Normally a family member will not be able to see you until you are in your assigned bed. 12. Visitation Restrictions Explained. Special Instructions:  Covid Test 10/28/21, Quarantine requirements discussed  Bring durable medical equipment (DME) needed:  Darericaa Katiegers oral diabetic medication on the MORNING OF surgery. Patient Prep:    use CHG solution    These surgical instructions were reviewed with patient during the PAT phone call.

## 2021-10-14 NOTE — PERIOP NOTES
Preoperative Nutrition Screen (BUD)   Patient's Age: 66 y.o. Patient's BMI: Estimated body mass index is 21.47 kg/m² as calculated from the following:    Height as of this encounter: 5' 5\" (1.651 m). Weight as of this encounter: 58.5 kg (129 lb). 1. Does the patient have a documented serum albumin less than 3.0 within the last 90 days? No = 0          2. Is patient's BMI less than 18.5 (or less than 20 if age over 72)? No = 0        3. Has the patient had an unplanned weight loss of 10% of body weight or more in the last 6 months? No = 0   4. Has the patient been eating less than 50% of their normal diet in the preceding week?  No = 0   UBD Score (number of Yes responses), 0-4 0       Electronically signed by Demetra Aguero RN on 10/14/21 at 2:17 PM

## 2021-10-28 PROBLEM — M17.12 PRIMARY LOCALIZED OSTEOARTHRITIS OF LEFT KNEE: Chronic | Status: ACTIVE | Noted: 2021-10-28

## 2021-10-28 RX ORDER — DEXAMETHASONE SODIUM PHOSPHATE 4 MG/ML
8 INJECTION, SOLUTION INTRA-ARTICULAR; INTRALESIONAL; INTRAMUSCULAR; INTRAVENOUS; SOFT TISSUE ONCE
Status: CANCELLED | OUTPATIENT
Start: 2021-10-28 | End: 2021-10-28

## 2021-10-28 RX ORDER — LISINOPRIL 5 MG/1
2.5 TABLET ORAL DAILY
Status: CANCELLED | OUTPATIENT
Start: 2021-10-28

## 2021-10-28 NOTE — DISCHARGE INSTRUCTIONS
[address] [place] [address] Sports Medicine   Patient Discharge Instructions    Mario Alberto Shankar / [id number] : 1945    Admitted (Not on file) Discharged: 10/28/2021     IF YOU HAVE ANY PROBLEMS ONCE YOU ARE  [facility]:   Main office number: [phone]    Your follow up appointment to see either Dr. Sarah Huffman PA-C, or Cesar Garrett PA-C as scheduled in 2 weeks. If you are unsure of your appointment date call the office at [phone]. Medication Instructions     · Resume your home medictions as directed, you may have directed not to resume supplements until after your follow up. · A prescription for pain medication has been given   · It is important that you take the medication exactly as they are prescribed. · Keep your medication in the bottles provided by the pharmacist and keep a list of the medication names, dosages, and times to be taken in your wallet. · Do not take other medications without consulting your doctor. What to do at [address] your prehospital diet. If you have excessive nausea or vomitting call your doctor's office. Be sure to maintain adequate fluid intake. Some pain medications may cause constipation. Remember to drink fluids, stay as active as possible, and eat plenty of fiber-rich foods. Begin In-Home Physical Therapy; 3 times a week to work on gait training, range of motion, strengthening, and weight bearing exercises as tolerable. Continue to use your walker or cane when walking. May progress from the walker to a cane to complete total bearing as tolerable. Patient may shower. Wrap incision with plastic wrap/covering to prevent incision from getting wet. Avoid complete immersion. YOUR DRESSING SHOULD BE CHANGED BY YOUR HOME HEALTH NURSE 5-7 AFTER SURGERY ACCORDING TO THE DATE WRITTEN ON YOUR DRESSING.       When to Call    - Call if you have a temperature greater then 101  - Unable to keep food down  - Are unable to bear any wieght   - Need a pain medication refill     Information obtained by :  I understand that if any problems occur once I am at home I am to contact my physician. I understand and acknowledge receipt of the instructions indicated above.                                                                                                                                            Physician's or R.N.'s Signature                                                                  Date/Time                                                                                                                                              Patient or Representative Signature                                                          Date/Time

## 2021-10-29 ENCOUNTER — HOSPITAL ENCOUNTER (OUTPATIENT)
Dept: PREADMISSION TESTING | Age: 76
Discharge: HOME | End: 2021-10-29
Payer: MEDICARE

## 2021-10-29 PROCEDURE — U0003 INFECTIOUS AGENT DETECTION BY NUCLEIC ACID (DNA OR RNA); SEVERE ACUTE RESPIRATORY SYNDROME CORONAVIRUS 2 (SARS-COV-2) (CORONAVIRUS DISEASE [COVID-19]), AMPLIFIED PROBE TECHNIQUE, MAKING USE OF HIGH THROUGHPUT TECHNOLOGIES AS DESCRIBED BY CMS-2020-01-R: HCPCS

## 2021-10-30 LAB — SARS-COV-2, COV2NT: NOT DETECTED

## 2021-11-02 ENCOUNTER — HOSPITAL ENCOUNTER (OUTPATIENT)
Age: 76
Discharge: HOME HEALTH | End: 2021-11-02
Attending: ORTHOPAEDIC SURGERY | Admitting: ORTHOPAEDIC SURGERY
Payer: MEDICARE

## 2021-11-02 ENCOUNTER — ANESTHESIA (OUTPATIENT)
Dept: SURGERY | Age: 76
End: 2021-11-02
Payer: MEDICARE

## 2021-11-02 ENCOUNTER — APPOINTMENT (OUTPATIENT)
Dept: GENERAL RADIOLOGY | Age: 76
End: 2021-11-02
Attending: PHYSICIAN ASSISTANT
Payer: MEDICARE

## 2021-11-02 ENCOUNTER — ANESTHESIA EVENT (OUTPATIENT)
Dept: SURGERY | Age: 76
End: 2021-11-02
Payer: MEDICARE

## 2021-11-02 ENCOUNTER — HOME HEALTH ADMISSION (OUTPATIENT)
Dept: HOME HEALTH SERVICES | Facility: HOME HEALTH | Age: 76
End: 2021-11-02
Payer: MEDICARE

## 2021-11-02 VITALS
WEIGHT: 129.2 LBS | SYSTOLIC BLOOD PRESSURE: 161 MMHG | HEIGHT: 65 IN | DIASTOLIC BLOOD PRESSURE: 75 MMHG | HEART RATE: 70 BPM | BODY MASS INDEX: 21.52 KG/M2 | OXYGEN SATURATION: 99 % | RESPIRATION RATE: 16 BRPM | TEMPERATURE: 97.3 F

## 2021-11-02 DIAGNOSIS — M17.12 LOCALIZED OSTEOARTHRITIS OF LEFT KNEE: Primary | ICD-10-CM

## 2021-11-02 LAB
ABO + RH BLD: NORMAL
BLOOD GROUP ANTIBODIES SERPL: NORMAL
GLUCOSE BLD STRIP.AUTO-MCNC: 130 MG/DL (ref 70–110)
GLUCOSE BLD STRIP.AUTO-MCNC: 144 MG/DL (ref 70–110)
SPECIMEN EXP DATE BLD: NORMAL

## 2021-11-02 PROCEDURE — 77030011628: Performed by: ORTHOPAEDIC SURGERY

## 2021-11-02 PROCEDURE — 74011000250 HC RX REV CODE- 250: Performed by: ORTHOPAEDIC SURGERY

## 2021-11-02 PROCEDURE — 64447 NJX AA&/STRD FEMORAL NRV IMG: CPT | Performed by: ORTHOPAEDIC SURGERY

## 2021-11-02 PROCEDURE — 77030039760: Performed by: ORTHOPAEDIC SURGERY

## 2021-11-02 PROCEDURE — 77030003666 HC NDL SPINAL BD -A: Performed by: ORTHOPAEDIC SURGERY

## 2021-11-02 PROCEDURE — 77030038010: Performed by: ORTHOPAEDIC SURGERY

## 2021-11-02 PROCEDURE — 77030037713 HC CLOSR DEV INCIS ZIP STRY -B: Performed by: ORTHOPAEDIC SURGERY

## 2021-11-02 PROCEDURE — 77030013708 HC HNDPC SUC IRR PULS STRY –B: Performed by: ORTHOPAEDIC SURGERY

## 2021-11-02 PROCEDURE — 76942 ECHO GUIDE FOR BIOPSY: CPT | Performed by: ORTHOPAEDIC SURGERY

## 2021-11-02 PROCEDURE — 74011250636 HC RX REV CODE- 250/636: Performed by: ORTHOPAEDIC SURGERY

## 2021-11-02 PROCEDURE — 77030034694 HC SCPL CANADY PLSM DISP USMD -E: Performed by: ORTHOPAEDIC SURGERY

## 2021-11-02 PROCEDURE — 73560 X-RAY EXAM OF KNEE 1 OR 2: CPT

## 2021-11-02 PROCEDURE — 76210000006 HC OR PH I REC 0.5 TO 1 HR: Performed by: ORTHOPAEDIC SURGERY

## 2021-11-02 PROCEDURE — 77030012508 HC MSK AIRWY LMA AMBU -A: Performed by: STUDENT IN AN ORGANIZED HEALTH CARE EDUCATION/TRAINING PROGRAM

## 2021-11-02 PROCEDURE — 97161 PT EVAL LOW COMPLEX 20 MIN: CPT

## 2021-11-02 PROCEDURE — 76210000025 HC REC RM PH II 3 TO 3.5 HR

## 2021-11-02 PROCEDURE — 76060000033 HC ANESTHESIA 1 TO 1.5 HR: Performed by: ORTHOPAEDIC SURGERY

## 2021-11-02 PROCEDURE — 2709999900 HC NON-CHARGEABLE SUPPLY: Performed by: ORTHOPAEDIC SURGERY

## 2021-11-02 PROCEDURE — 77030012893: Performed by: ORTHOPAEDIC SURGERY

## 2021-11-02 PROCEDURE — 86901 BLOOD TYPING SEROLOGIC RH(D): CPT

## 2021-11-02 PROCEDURE — 77030031139 HC SUT VCRL2 J&J -A: Performed by: ORTHOPAEDIC SURGERY

## 2021-11-02 PROCEDURE — 74011250637 HC RX REV CODE- 250/637: Performed by: ORTHOPAEDIC SURGERY

## 2021-11-02 PROCEDURE — C1776 JOINT DEVICE (IMPLANTABLE): HCPCS | Performed by: ORTHOPAEDIC SURGERY

## 2021-11-02 PROCEDURE — 74011250636 HC RX REV CODE- 250/636: Performed by: STUDENT IN AN ORGANIZED HEALTH CARE EDUCATION/TRAINING PROGRAM

## 2021-11-02 PROCEDURE — 77030002934 HC SUT MCRYL J&J -B: Performed by: ORTHOPAEDIC SURGERY

## 2021-11-02 PROCEDURE — 74011000250 HC RX REV CODE- 250: Performed by: NURSE ANESTHETIST, CERTIFIED REGISTERED

## 2021-11-02 PROCEDURE — 76010000149 HC OR TIME 1 TO 1.5 HR: Performed by: ORTHOPAEDIC SURGERY

## 2021-11-02 PROCEDURE — 74011000258 HC RX REV CODE- 258: Performed by: ORTHOPAEDIC SURGERY

## 2021-11-02 PROCEDURE — 36415 COLL VENOUS BLD VENIPUNCTURE: CPT

## 2021-11-02 PROCEDURE — 77030033263 HC DRSG MEPILEX 16-48IN BORD MOLN -B: Performed by: ORTHOPAEDIC SURGERY

## 2021-11-02 PROCEDURE — 77030027138 HC INCENT SPIROMETER -A: Performed by: ORTHOPAEDIC SURGERY

## 2021-11-02 PROCEDURE — 77030020782 HC GWN BAIR PAWS FLX 3M -B: Performed by: ORTHOPAEDIC SURGERY

## 2021-11-02 PROCEDURE — 97535 SELF CARE MNGMENT TRAINING: CPT

## 2021-11-02 PROCEDURE — C1713 ANCHOR/SCREW BN/BN,TIS/BN: HCPCS | Performed by: ORTHOPAEDIC SURGERY

## 2021-11-02 PROCEDURE — 64999 UNLISTED PX NERVOUS SYSTEM: CPT | Performed by: ORTHOPAEDIC SURGERY

## 2021-11-02 PROCEDURE — 82962 GLUCOSE BLOOD TEST: CPT

## 2021-11-02 PROCEDURE — 74011250637 HC RX REV CODE- 250/637: Performed by: PHYSICIAN ASSISTANT

## 2021-11-02 PROCEDURE — 97116 GAIT TRAINING THERAPY: CPT

## 2021-11-02 PROCEDURE — 74011250636 HC RX REV CODE- 250/636: Performed by: NURSE ANESTHETIST, CERTIFIED REGISTERED

## 2021-11-02 PROCEDURE — 74011000250 HC RX REV CODE- 250: Performed by: STUDENT IN AN ORGANIZED HEALTH CARE EDUCATION/TRAINING PROGRAM

## 2021-11-02 PROCEDURE — 74011250637 HC RX REV CODE- 250/637: Performed by: STUDENT IN AN ORGANIZED HEALTH CARE EDUCATION/TRAINING PROGRAM

## 2021-11-02 PROCEDURE — 97166 OT EVAL MOD COMPLEX 45 MIN: CPT

## 2021-11-02 DEVICE — 32MM PATELLA, VITAMIN E
Type: IMPLANTABLE DEVICE | Site: KNEE | Status: FUNCTIONAL
Brand: BKS E-VITALIZE

## 2021-11-02 DEVICE — KNEE K1 TOT HEMI STD CEM IMPL CAPPED K1 OD: Type: IMPLANTABLE DEVICE | Site: KNEE | Status: FUNCTIONAL

## 2021-11-02 DEVICE — SIZE 4 TIBIAL TRAY NONPOROUS
Type: IMPLANTABLE DEVICE | Site: KNEE | Status: FUNCTIONAL
Brand: BALANCED KNEE SYSTEM

## 2021-11-02 DEVICE — LT SIZE 4 NARROW FEMORAL CR NONPOROUS
Type: IMPLANTABLE DEVICE | Site: KNEE | Status: FUNCTIONAL
Brand: BKS TRIMAX

## 2021-11-02 DEVICE — SIZE 4 11MM TIBIAL INSERT UC
Type: IMPLANTABLE DEVICE | Site: KNEE | Status: FUNCTIONAL
Brand: BKS E-VITALIZE

## 2021-11-02 DEVICE — CEMENT BNE 40GM FULL DOSE PMMA W/O ANTIBIO HI VISC N RADPQ: Type: IMPLANTABLE DEVICE | Site: KNEE | Status: FUNCTIONAL

## 2021-11-02 RX ORDER — HYDROMORPHONE HYDROCHLORIDE 2 MG/1
2-4 TABLET ORAL
Status: DISCONTINUED | OUTPATIENT
Start: 2021-11-02 | End: 2021-11-02 | Stop reason: HOSPADM

## 2021-11-02 RX ORDER — LANOLIN ALCOHOL/MO/W.PET/CERES
1 CREAM (GRAM) TOPICAL 3 TIMES DAILY
Status: DISCONTINUED | OUTPATIENT
Start: 2021-11-02 | End: 2021-11-02 | Stop reason: HOSPADM

## 2021-11-02 RX ORDER — PHENYLEPHRINE HCL IN 0.9% NACL 1 MG/10 ML
SYRINGE (ML) INTRAVENOUS AS NEEDED
Status: DISCONTINUED | OUTPATIENT
Start: 2021-11-02 | End: 2021-11-02 | Stop reason: HOSPADM

## 2021-11-02 RX ORDER — ASPIRIN 325 MG
325 TABLET, DELAYED RELEASE (ENTERIC COATED) ORAL 2 TIMES DAILY
Status: DISCONTINUED | OUTPATIENT
Start: 2021-11-02 | End: 2021-11-02 | Stop reason: HOSPADM

## 2021-11-02 RX ORDER — HYDROMORPHONE HYDROCHLORIDE 1 MG/ML
INJECTION, SOLUTION INTRAMUSCULAR; INTRAVENOUS; SUBCUTANEOUS AS NEEDED
Status: DISCONTINUED | OUTPATIENT
Start: 2021-11-02 | End: 2021-11-02 | Stop reason: HOSPADM

## 2021-11-02 RX ORDER — CEFAZOLIN SODIUM/WATER 2 G/20 ML
2 SYRINGE (ML) INTRAVENOUS EVERY 8 HOURS
Status: DISCONTINUED | OUTPATIENT
Start: 2021-11-02 | End: 2021-11-02 | Stop reason: HOSPADM

## 2021-11-02 RX ORDER — SODIUM CHLORIDE 0.9 % (FLUSH) 0.9 %
5-40 SYRINGE (ML) INJECTION AS NEEDED
Status: DISCONTINUED | OUTPATIENT
Start: 2021-11-02 | End: 2021-11-02 | Stop reason: HOSPADM

## 2021-11-02 RX ORDER — DOCUSATE SODIUM 100 MG/1
100 CAPSULE, LIQUID FILLED ORAL 2 TIMES DAILY
Status: DISCONTINUED | OUTPATIENT
Start: 2021-11-02 | End: 2021-11-02 | Stop reason: HOSPADM

## 2021-11-02 RX ORDER — MIDAZOLAM HYDROCHLORIDE 1 MG/ML
INJECTION, SOLUTION INTRAMUSCULAR; INTRAVENOUS
Status: COMPLETED | OUTPATIENT
Start: 2021-11-02 | End: 2021-11-02

## 2021-11-02 RX ORDER — ONDANSETRON 2 MG/ML
INJECTION INTRAMUSCULAR; INTRAVENOUS AS NEEDED
Status: DISCONTINUED | OUTPATIENT
Start: 2021-11-02 | End: 2021-11-02 | Stop reason: HOSPADM

## 2021-11-02 RX ORDER — CEFAZOLIN SODIUM/WATER 2 G/20 ML
2 SYRINGE (ML) INTRAVENOUS ONCE
Status: COMPLETED | OUTPATIENT
Start: 2021-11-02 | End: 2021-11-02

## 2021-11-02 RX ORDER — LIDOCAINE HYDROCHLORIDE 20 MG/ML
INJECTION, SOLUTION EPIDURAL; INFILTRATION; INTRACAUDAL; PERINEURAL AS NEEDED
Status: DISCONTINUED | OUTPATIENT
Start: 2021-11-02 | End: 2021-11-02 | Stop reason: HOSPADM

## 2021-11-02 RX ORDER — METOCLOPRAMIDE HYDROCHLORIDE 5 MG/ML
10 INJECTION INTRAMUSCULAR; INTRAVENOUS
Status: DISCONTINUED | OUTPATIENT
Start: 2021-11-02 | End: 2021-11-02 | Stop reason: HOSPADM

## 2021-11-02 RX ORDER — DEXMEDETOMIDINE HYDROCHLORIDE 100 UG/ML
INJECTION, SOLUTION INTRAVENOUS
Status: SHIPPED | OUTPATIENT
Start: 2021-11-02 | End: 2021-11-02

## 2021-11-02 RX ORDER — DIPHENHYDRAMINE HYDROCHLORIDE 50 MG/ML
12.5 INJECTION, SOLUTION INTRAMUSCULAR; INTRAVENOUS
Status: DISCONTINUED | OUTPATIENT
Start: 2021-11-02 | End: 2021-11-02 | Stop reason: HOSPADM

## 2021-11-02 RX ORDER — SODIUM CHLORIDE 0.9 % (FLUSH) 0.9 %
5-40 SYRINGE (ML) INJECTION EVERY 8 HOURS
Status: DISCONTINUED | OUTPATIENT
Start: 2021-11-02 | End: 2021-11-02 | Stop reason: HOSPADM

## 2021-11-02 RX ORDER — CELECOXIB 100 MG/1
400 CAPSULE ORAL
Status: COMPLETED | OUTPATIENT
Start: 2021-11-02 | End: 2021-11-02

## 2021-11-02 RX ORDER — ACETAMINOPHEN 500 MG
1000 TABLET ORAL
Status: COMPLETED | OUTPATIENT
Start: 2021-11-02 | End: 2021-11-02

## 2021-11-02 RX ORDER — ACETAMINOPHEN 325 MG/1
650 TABLET ORAL EVERY 6 HOURS
Status: DISCONTINUED | OUTPATIENT
Start: 2021-11-02 | End: 2021-11-02 | Stop reason: HOSPADM

## 2021-11-02 RX ORDER — CEFAZOLIN SODIUM/WATER 2 G/20 ML
2 SYRINGE (ML) INTRAVENOUS ONCE
Status: DISCONTINUED | OUTPATIENT
Start: 2021-11-02 | End: 2021-11-02 | Stop reason: SDUPTHER

## 2021-11-02 RX ORDER — DEXAMETHASONE SODIUM PHOSPHATE 10 MG/ML
INJECTION INTRAMUSCULAR; INTRAVENOUS
Status: SHIPPED | OUTPATIENT
Start: 2021-11-02 | End: 2021-11-02

## 2021-11-02 RX ORDER — SODIUM CHLORIDE, SODIUM LACTATE, POTASSIUM CHLORIDE, CALCIUM CHLORIDE 600; 310; 30; 20 MG/100ML; MG/100ML; MG/100ML; MG/100ML
50 INJECTION, SOLUTION INTRAVENOUS CONTINUOUS
Status: DISCONTINUED | OUTPATIENT
Start: 2021-11-02 | End: 2021-11-02 | Stop reason: HOSPADM

## 2021-11-02 RX ORDER — EPHEDRINE SULFATE/0.9% NACL/PF 50 MG/5 ML
SYRINGE (ML) INTRAVENOUS AS NEEDED
Status: DISCONTINUED | OUTPATIENT
Start: 2021-11-02 | End: 2021-11-02 | Stop reason: HOSPADM

## 2021-11-02 RX ORDER — PANTOPRAZOLE SODIUM 40 MG/1
40 TABLET, DELAYED RELEASE ORAL DAILY
Status: DISCONTINUED | OUTPATIENT
Start: 2021-11-02 | End: 2021-11-02 | Stop reason: HOSPADM

## 2021-11-02 RX ORDER — NALOXONE HYDROCHLORIDE 0.4 MG/ML
0.04 INJECTION, SOLUTION INTRAMUSCULAR; INTRAVENOUS; SUBCUTANEOUS
Status: DISCONTINUED | OUTPATIENT
Start: 2021-11-02 | End: 2021-11-02 | Stop reason: HOSPADM

## 2021-11-02 RX ORDER — ASPIRIN 325 MG
325 TABLET ORAL 2 TIMES DAILY
Qty: 42 TABLET | Refills: 0 | Status: SHIPPED | OUTPATIENT
Start: 2021-11-02 | End: 2021-11-23

## 2021-11-02 RX ORDER — HYDROMORPHONE HYDROCHLORIDE 1 MG/ML
0.2 INJECTION, SOLUTION INTRAMUSCULAR; INTRAVENOUS; SUBCUTANEOUS AS NEEDED
Status: DISCONTINUED | OUTPATIENT
Start: 2021-11-02 | End: 2021-11-02 | Stop reason: HOSPADM

## 2021-11-02 RX ORDER — ROPIVACAINE HYDROCHLORIDE 5 MG/ML
INJECTION, SOLUTION EPIDURAL; INFILTRATION; PERINEURAL
Status: SHIPPED | OUTPATIENT
Start: 2021-11-02 | End: 2021-11-02

## 2021-11-02 RX ORDER — ONDANSETRON 2 MG/ML
4 INJECTION INTRAMUSCULAR; INTRAVENOUS
Status: DISCONTINUED | OUTPATIENT
Start: 2021-11-02 | End: 2021-11-02 | Stop reason: HOSPADM

## 2021-11-02 RX ORDER — NALBUPHINE HYDROCHLORIDE 10 MG/ML
5 INJECTION, SOLUTION INTRAMUSCULAR; INTRAVENOUS; SUBCUTANEOUS
Status: DISCONTINUED | OUTPATIENT
Start: 2021-11-02 | End: 2021-11-02 | Stop reason: HOSPADM

## 2021-11-02 RX ORDER — SODIUM CHLORIDE, SODIUM LACTATE, POTASSIUM CHLORIDE, CALCIUM CHLORIDE 600; 310; 30; 20 MG/100ML; MG/100ML; MG/100ML; MG/100ML
125 INJECTION, SOLUTION INTRAVENOUS CONTINUOUS
Status: DISCONTINUED | OUTPATIENT
Start: 2021-11-02 | End: 2021-11-02 | Stop reason: HOSPADM

## 2021-11-02 RX ORDER — FENTANYL CITRATE 50 UG/ML
25 INJECTION, SOLUTION INTRAMUSCULAR; INTRAVENOUS
Status: DISCONTINUED | OUTPATIENT
Start: 2021-11-02 | End: 2021-11-02 | Stop reason: HOSPADM

## 2021-11-02 RX ORDER — HYDROMORPHONE HYDROCHLORIDE 2 MG/1
2-4 TABLET ORAL
Qty: 60 TABLET | Refills: 0 | Status: SHIPPED | OUTPATIENT
Start: 2021-11-02 | End: 2021-11-09

## 2021-11-02 RX ORDER — PROPOFOL 10 MG/ML
INJECTION, EMULSION INTRAVENOUS AS NEEDED
Status: DISCONTINUED | OUTPATIENT
Start: 2021-11-02 | End: 2021-11-02 | Stop reason: HOSPADM

## 2021-11-02 RX ORDER — SODIUM CHLORIDE 9 MG/ML
125 INJECTION, SOLUTION INTRAVENOUS CONTINUOUS
Status: DISCONTINUED | OUTPATIENT
Start: 2021-11-02 | End: 2021-11-02 | Stop reason: HOSPADM

## 2021-11-02 RX ORDER — ZOLPIDEM TARTRATE 5 MG/1
5-10 TABLET ORAL
Status: DISCONTINUED | OUTPATIENT
Start: 2021-11-02 | End: 2021-11-02 | Stop reason: HOSPADM

## 2021-11-02 RX ORDER — CEFADROXIL 500 MG/1
500 CAPSULE ORAL 2 TIMES DAILY
Qty: 10 CAPSULE | Refills: 0 | Status: SHIPPED | OUTPATIENT
Start: 2021-11-02 | End: 2021-11-07

## 2021-11-02 RX ORDER — NALOXONE HYDROCHLORIDE 0.4 MG/ML
0.4 INJECTION, SOLUTION INTRAMUSCULAR; INTRAVENOUS; SUBCUTANEOUS AS NEEDED
Status: DISCONTINUED | OUTPATIENT
Start: 2021-11-02 | End: 2021-11-02 | Stop reason: HOSPADM

## 2021-11-02 RX ORDER — SODIUM CHLORIDE 9 MG/ML
300 INJECTION, SOLUTION INTRAVENOUS CONTINUOUS
Status: DISCONTINUED | OUTPATIENT
Start: 2021-11-02 | End: 2021-11-02 | Stop reason: HOSPADM

## 2021-11-02 RX ORDER — TRANEXAMIC ACID 650 1/1
1950 TABLET ORAL ONCE
Status: COMPLETED | OUTPATIENT
Start: 2021-11-02 | End: 2021-11-02

## 2021-11-02 RX ADMIN — SODIUM CHLORIDE, POTASSIUM CHLORIDE, SODIUM LACTATE AND CALCIUM CHLORIDE 125 ML/HR: 600; 310; 30; 20 INJECTION, SOLUTION INTRAVENOUS at 06:50

## 2021-11-02 RX ADMIN — TRANEXAMIC ACID 1950 MG: 650 TABLET ORAL at 06:26

## 2021-11-02 RX ADMIN — DEXAMETHASONE SODIUM PHOSPHATE 4 MG: 10 INJECTION, SOLUTION INTRAMUSCULAR; INTRAVENOUS at 07:17

## 2021-11-02 RX ADMIN — Medication 50 MCG: at 08:11

## 2021-11-02 RX ADMIN — SODIUM CHLORIDE, POTASSIUM CHLORIDE, SODIUM LACTATE AND CALCIUM CHLORIDE 1000 ML: 600; 310; 30; 20 INJECTION, SOLUTION INTRAVENOUS at 06:50

## 2021-11-02 RX ADMIN — DEXMEDETOMIDINE HYDROCHLORIDE 25 MCG: 100 INJECTION, SOLUTION INTRAVENOUS at 07:17

## 2021-11-02 RX ADMIN — CELECOXIB 400 MG: 100 CAPSULE ORAL at 07:03

## 2021-11-02 RX ADMIN — Medication 100 MCG: at 08:25

## 2021-11-02 RX ADMIN — ACETAMINOPHEN 1000 MG: 500 TABLET ORAL at 07:03

## 2021-11-02 RX ADMIN — LIDOCAINE HYDROCHLORIDE 60 MG: 20 INJECTION, SOLUTION INTRAVENOUS at 08:05

## 2021-11-02 RX ADMIN — PANTOPRAZOLE SODIUM 40 MG: 40 TABLET, DELAYED RELEASE ORAL at 06:26

## 2021-11-02 RX ADMIN — HYDROMORPHONE HYDROCHLORIDE 0.5 MG: 1 INJECTION, SOLUTION INTRAMUSCULAR; INTRAVENOUS; SUBCUTANEOUS at 08:02

## 2021-11-02 RX ADMIN — Medication 5 MG: at 08:35

## 2021-11-02 RX ADMIN — PROPOFOL 140 MG: 10 INJECTION, EMULSION INTRAVENOUS at 08:05

## 2021-11-02 RX ADMIN — Medication 50 MCG: at 08:19

## 2021-11-02 RX ADMIN — Medication 5 MG: at 08:40

## 2021-11-02 RX ADMIN — CEFAZOLIN 2 G: 1 INJECTION, POWDER, FOR SOLUTION INTRAVENOUS at 08:08

## 2021-11-02 RX ADMIN — ONDANSETRON HYDROCHLORIDE 4 MG: 2 INJECTION INTRAMUSCULAR; INTRAVENOUS at 09:13

## 2021-11-02 RX ADMIN — ROPIVACAINE HYDROCHLORIDE 20 ML: 5 INJECTION, SOLUTION EPIDURAL; INFILTRATION; PERINEURAL at 07:17

## 2021-11-02 RX ADMIN — MIDAZOLAM 2 MG: 1 INJECTION INTRAMUSCULAR; INTRAVENOUS at 07:17

## 2021-11-02 RX ADMIN — ROPIVACAINE HYDROCHLORIDE 15 ML: 5 INJECTION, SOLUTION EPIDURAL; INFILTRATION; PERINEURAL at 07:20

## 2021-11-02 NOTE — PROGRESS NOTES
Progression of Symptoms:    [] Pain 3/10   [] Improvement post medication administration   [] No improvement, provider notified   [] Nausea   [] Improvement post medication administration   [] No improvement, provider notified   [] Hypotension/Hypertension   [] Improved post medication administration   [] No improvement, provider notified     Readiness for Discharge:  [x] Vital signs stable  [x] + Voiding  [x] Wound intact, drainage minimal  [x] Tolerating PO intake  [] Cleared by PT (OT if applicable)  [x] Discharge education completed with patient and family member to specifically include   [x] Recommended stool softener  [x] Proper elevation visual demonstration

## 2021-11-02 NOTE — PERIOP NOTES
[facility]enn[facility] TRANSFER - OUT REPORT:    Verbal report given to Meghan RN(name) on Rodolfokurt Modesto Elder  being transferred to [facility](unit) for routine post - op       Report consisted of patients Situation, Background, Assessment and   Recommendations(SBAR). Information from the following report(s) SBAR, Kardex, [facility] and Cardiac Rhythm NSR was reviewed with the receiving nurse. Lines:   Peripheral IV 11/02/21 Right; Outer Forearm (Active)   Site Assessment Clean, dry, & intact 11/02/21 0956   Phlebitis Assessment 0 11/02/21 0956   Infiltration Assessment 0 11/02/21 0956   Dressing Status Clean, dry, & intact 11/02/21 0956   Dressing Type Transparent;Tape 11/02/21 0956   Hub Color/Line Status Patent; Infusing;Green 11/02/21 0956        Opportunity for questions and clarification was provided.       Patient transported with:   Registered Nurse   ICS

## 2021-11-02 NOTE — PERIOP NOTES
Reviewed PTA medication list with patient/caregiver and patient/caregiver denies any additional medications. Patient admits to having a responsible adult care for them at home for at least 24 hours after surgery. Patient encouraged to use gown warming system and informed that using said warming gown to regulate body temperature prior to a procedure has been shown to help reduce the risks of blood clots and infection. Patient's pharmacy of choice verified and documented in PTA medication section. Dual skin assessment & fall risk band verification completed with Nirali RAO.

## 2021-11-02 NOTE — ANESTHESIA PROCEDURE NOTES
Peripheral Block    Start time: 11/2/2021 7:14 AM  End time: 11/2/2021 7:17 AM  Performed by: Daina Stark MD  Authorized by: Daina Stark MD       Pre-procedure: Indications: at surgeon's request and post-op pain management    Preanesthetic Checklist: patient identified, risks and benefits discussed, site marked, timeout performed, anesthesia consent given and patient being monitored    Timeout Time: 07:14 EDT          Block Type:   Block Type: Adductor canal block  Laterality:  Left  Monitoring:  Standard ASA monitoring, responsive to questions, oxygen, continuous pulse ox, frequent vital sign checks and heart rate  Injection Technique:  Single shot  Procedures: ultrasound guided    Patient Position: supine  Prep: chlorhexidine    Location:  Mid thigh  Needle Type:  Stimuplex  Needle Gauge:  20 G  Needle Localization:  Ultrasound guidance  Medication Injected:  Midazolam (VERSED) injection, 2 mg  ropivacaine (PF) (NAROPIN) 5 mg/mL (0.5 %) injection, 20 mL  dexamethasone (PF) (DECADRON) 10 mg/mL injection, 4 mg  dexmedeTOMidine (PRECEDEX) 100 mcg/mL iv solution, 25 mcg  Med Admin Time: 11/2/2021 7:17 AM    Assessment:  Number of attempts:  1  Injection Assessment:  Incremental injection every 5 mL, negative aspiration for CSF, no paresthesia, ultrasound image on chart, local visualized surrounding nerve on ultrasound, negative aspiration for blood and no intravascular symptoms  Patient tolerance:  Patient tolerated the procedure well with no immediate complications  Ultrasound guidance was used to view and verify medication disbursement and was also used for needle placement.

## 2021-11-02 NOTE — DISCHARGE SUMMARY
[address] [address]     DISCHARGE SUMMARY     PATIENT: Evangelist Pedraza     MRN: [id number]   ADMIT DATE: 2021   [facility]   DISCHARGE DATE: 2021     ATTENDING: Kai Jefferson MD   DICTATING: BERENICE Alexander         ADMISSION DIAGNOSIS: Localized osteoarthritis of left knee [M17.12]    DISCHARGE DIAGNOSIS: Status post LEFT TOTAL KNEE ARTHROPLASTY    HISTORY OF PRESENT ILLNESS: The patient is a 66y.o. year-old male   with ongoing left knee pain secondary to osteoarthritis of his left knee. The patient's pain has persisted and progressed despite conservative treatments and therapies. The patient has at this time opted for surgical intervention. PAST MEDICAL HISTORY:   Past Medical History:   Diagnosis Date    Arthritis     Back pain     Bladder stone     BPH with obstruction/lower urinary tract symptoms     Cancer ([address] 2013    skin cancer neck     Chronic pain     hip    Colon polyp     Diabetes (HCC)     diet controlled and exercise    ED (erectile dysfunction)     Elevated PSA     Erectile dysfunction     Gout     States in his hand and recently on medication.  Hydrocele     Hypertension     Lower urinary tract symptoms (LUTS)     Nocturia     Osteoarthritis of left hip 2012    Osteoarthritis of right hip 2016    PONV (postoperative nausea and vomiting)     Primary localized osteoarthritis of left knee 10/28/2021    Urinary frequency     UTI (urinary tract infection)     States he is on medication and does not remember the name. He is going to contact Dr. Arsenio Scherer for questions. PAST SURGICAL HISTORY:   Past Surgical History:   Procedure Laterality Date    HX COLONOSCOPY  2017    HX HIP REPLACEMENT Left     HX HIP REPLACEMENT Right 16    HX KNEE ARTHROSCOPY Left     HX LAP CHOLECYSTECTOMY  12/3/15    HX MOHS PROCEDURES Left     HX UROLOGICAL  2007    PNBx. Benign.  HX UROLOGICAL  01/02/2018    [facility]. Cystoscopy with Large Bladder stones Cystolithopaxy and PVP. Dr. Amparo Gilmore. ALLERGIES:   Allergies   Allergen Reactions    Other Food Runny Nose     Mono sodium gluconate    Oxycodone-Acetaminophen Other (comments)     Other reaction(s): gi distress        CURRENT MEDICATIONS:  A list of medications prior to the time of admission include:  Prior to Admission medications    Medication Sig Start Date End Date Taking? Authorizing Provider   aspirin (ASPIRIN) 325 mg tablet Take 1 Tablet by mouth two (2) times a day for 21 days. 11/2/21 11/23/21 Yes Nicolás Velasco PA   cefadroxil (DURICEF) 500 mg capsule Take 1 Capsule by mouth two (2) times a day for 5 days. 11/2/21 11/7/21 Yes Nicolás Velasco PA   HYDROmorphone (Dilaudid) 2 mg tablet Take 1-2 Tablets by mouth every four (4) hours as needed for Pain for up to 7 days. Max Daily Amount: 24 mg. 11/2/21 11/9/21 Yes Nicolás Velasco PA   glimepiride (AMARYL) 1 mg tablet Take 0.5 mg by mouth Daily (before breakfast). Provider, Historical   lisinopril (PRINIVIL, ZESTRIL) 10 mg tablet Take 2.5 mg by mouth daily. Provider, Historical       FAMILY HISTORY: History reviewed. No pertinent family history. SOCIAL HISTORY:   Social History     Socioeconomic History    Marital status:      Spouse name: Not on file    Number of children: Not on file    Years of education: Not on file    Highest education level: Not on file   Tobacco Use    Smoking status: Never Smoker    Smokeless tobacco: Never Used   Substance and Sexual Activity    Alcohol use: No    Drug use: No    Sexual activity: Never     Social Determinants of Health     Financial Resource Strain:     Difficulty of Paying Living Expenses:    Food Insecurity:     Worried About Running Out of Food in the Last Year:     [address] in the Last Year:    Transportation Needs:     Lack of Transportation (Medical):      Lack of Transportation (Non-Medical):    Physical Activity:     Days of Exercise per Week:     Minutes of Exercise per Session:    Stress:     Feeling of Stress :    Social Connections:     Frequency of Communication with Friends and Family:     Frequency of Social Gatherings with Friends and Family:     Attends [employer] Services:     Active Member of Clubs or Organizations:     Attends Club or Organization Meetings:     Marital Status:        REVIEW OF SYSTEMS: All review of systems are negative. PHYSICAL EXAMINATION: For a detailed physical exam, please refer to the patient's chart. HOSPITAL COURSE: The patient was taken to surgery the day of admission. he underwent a left total knee replacement. Operative course was benign. Estimated blood loss was approximately 150 cc. The patient was taken to the PACU in stable condition and was later taken to the floor in stable condition. During his hospital stay, the patient progressed well with physical therapy and occupational therapy, adherent to instructions. he had been cleared by physical therapy with stair training. he was placed on Aspirin for DVT prophylaxis. his pain has been well controlled with oral pain medications. his vitals have remained stable. he has also remained hemodynamically stable. The patient has been recommended for discharge home. DISCHARGE INSTRUCTIONS: The patient is to be discharged home. he is to continue on his prior medications per the medication reconciliation form, to which we will add:  1. Aspirin 325 mg; 1 tablet po bid x 21 days  2. Duricef 500 mg; 1 tablet po bid x 5 days  3. Dilaudid 2 mg; 1 tablets p.o. every 4 hours p.r.n. for pain    The patient is to continue at home with home physical therapy 3 times a week to work on gait training, range of motion, strengthening, and weightbearing exercises as tolerated on his left lower extremity. The patient is to progress from a walker to a cane to complete total weightbearing as tolerable. The patient is to continue to keep his incision dry. The patient is to followup with Dr. Paty Warren, Thomas Amor PA-C and/or Raoul Kwong PA-C in the office approximately 10-14 days status post for x-rays and further evaluation.     Juan Martinez 1723, PA  11/2/20212:29 PM

## 2021-11-02 NOTE — PROGRESS NOTES
Problem: Self Care Deficits Care Plan (Adult)  Goal: *Acute Goals and Plan of Care (Insert Text)  Description: Initial Occupational Therapy Goals (11/2/2021) Within 7 day(s):    1. Patient will perform grooming standing sinkside with supervision for increased independence with ADLs. 2. Patient will perform LB dressing with supervision & A/E PRN for increased independence with ADLs. 3. Patient will perform toilet transfer with supervision for increased independence with ADLs. 4. Patient will perform all aspects of toileting with supervision for increased independence with ADLs. 5. Patient will independently apply energy conservation techniques with 1 verbal cue(s)for increased independence with ADLs. 6. Patient will perform bathroom mobility with supervision for increased independence/safety with ADLs. Outcome: Progressing Towards Goal  OCCUPATIONAL THERAPY EVALUATION    Patient: Wade Pham (18 y.o. male)  Date: 11/2/2021  Primary Diagnosis: Localized osteoarthritis of left knee [M17.12]  Procedure(s) (LRB):  LEFT TOTAL KNEE REPLACEMENT (Left) Day of Surgery   Precautions: Fall, WBAT  PLOF: pt mod I for ADLs/functional mobility    ASSESSMENT AND RECOMMENDATIONS:  Based on the objective data described below, the patient presents with LLE decreased ROM and strength affecting LE ADLs. Pt found seated in recliner chair, vitals assessed and WNL, pt reporting pain 0/10, agreeable to therapy. Pt drowsy throughout session, and with poor safety awareness. Educated pt on proper body mechanics for ADLs s/p TKR. Pt completed upper body dressing with supervision seated in chair. Pt able to thread B feet through underwear/pants with min A, and CGA when standing to pull up to waist. Pt CGA for STS/bathroom mobility with frequent vc for safety with RW. Pt voided standing at toilet with CGA. Pt ambulated back to recliner, ice applied to L knee. Spouse present during session for education on home safety.  Provided opportunity for pt to voice questions on ADL performance when home, pt has no further concerns. Patient will benefit from skilled Occupational Therapy intervention to maximize safety/independence with ADLs at d/c.    Education: Reviewed home safety, body mechanics, importance of moving every hour to prevent joint stiffness, role of ice for edema/pain control, Rolling Walker management/safety, and adaptive dressing techniques with patient verbalizing  understanding at this time     Patient will benefit from skilled intervention to address the above impairments. Patient's rehabilitation potential is considered to be Good  Factors which may influence rehabilitation potential include:   []             None noted  []             Mental ability/status  []             Medical condition  []             Home/family situation and support systems  [x]             Safety awareness  []             Pain tolerance/management  []             Other:        PLAN :  Recommendations and Planned Interventions:   [x]               Self Care Training                  [x]      Therapeutic Activities  [x]               Functional Mobility Training   []      Cognitive Retraining  []               Therapeutic Exercises           []      Endurance Activities  []               Balance Training                    []      Neuromuscular Re-Education  []               Visual/Perceptual Training     [x]      Home Safety Training  [x]               Patient Education                   [x]      Family Training/Education  []               Other (comment):    Frequency/Duration: Patient will be followed by Occupational Therapy 1-2 times per day/4-7 days per week to address goals. Discharge Recommendations: Home health with adult supervision at least 24 hours after d/c  Further Equipment Recommendations for Discharge: N/A     SUBJECTIVE:   Patient stated Severiano Grieves had my hips done.     OBJECTIVE DATA SUMMARY:     Past Medical History:   Diagnosis Date    Arthritis Back pain     Bladder stone     BPH with obstruction/lower urinary tract symptoms     Cancer ([facility] [address] 2013    skin cancer neck     Chronic pain     hip    Colon polyp     Diabetes ([facility] [address]     diet controlled and exercise    ED (erectile dysfunction)     Elevated PSA     Erectile dysfunction     Gout     States in his hand and recently on medication. Hydrocele     Hypertension     Lower urinary tract symptoms (LUTS)     Nocturia     Osteoarthritis of left hip 12/19/2012    Osteoarthritis of right hip 2/1/2016    PONV (postoperative nausea and vomiting)     Primary localized osteoarthritis of left knee 10/28/2021    Urinary frequency     UTI (urinary tract infection)     States he is on medication and does not remember the name. He is going to contact Dr. Jaswant Jung for questions. Past Surgical History:   Procedure Laterality Date    HX COLONOSCOPY  01/09/2017    HX HIP REPLACEMENT Left     HX HIP REPLACEMENT Right 2/2/16    HX KNEE ARTHROSCOPY Left 2005    HX LAP CHOLECYSTECTOMY  12/3/15    HX MOHS PROCEDURES Left 2009    HX UROLOGICAL  07/24/2007    PNBx. Benign. HX UROLOGICAL  01/02/2018    [facility]. Cystoscopy with Large Bladder stones Cystolithopaxy and PVP. Dr. Varghese Armenta.        Barriers to Learning/Limitations: yes;  physical and altered mental status (i.e.Sedation, Confusion)  Compensate with: visual, verbal, tactile, kinesthetic cues/model    Home Situation/Prior Level of Function:   Home Situation  Home Environment: Private residence  # Steps to Enter: 2  Rails to Enter: No  One/Two Story Residence: Two story  # of Interior Steps: [address] [facility] Floor: Right  Lift Chair Available: No  Living Alone: No  Support Systems: Spouse/Significant Other  Patient Expects to be Discharged to[de-identified] [employer]  Current DME Used/Available at Home: Walker, rolling, Commode, bedside, Wheelchair  Tub or Shower Type: Tub/Shower combination  []  Right hand dominant   []  Left hand dominant    Cognitive/Behavioral Status:  Neurologic State: Alert; Drowsy  Orientation Level: Oriented to person;Oriented to place;Oriented to situation  Cognition: Follows commands; Impulsive;Poor safety awareness  Safety/Judgement: Awareness of environment;Decreased awareness of need for safety    Skin: L knee incision w/ Mepilex   Edema: compression hose in place & applied ice     Coordination: BUE  Coordination: Generally decreased, functional  Fine Motor Skills-Upper: Left Intact; Right Intact    Gross Motor Skills-Upper: Left Intact; Right Intact    Balance:  Sitting: Intact  Standing: Intact; With support    Strength: BUE  Strength: Generally decreased, functional    Tone & Sensation:BUE  Tone: Normal  Sensation: Impaired (L knee)    Functional Mobility and Transfers for ADLs:  Bed Mobility:  Scooting: Stand-by assistance  Transfers:  Sit to Stand: Contact guard assistance (vc)   Bathroom Mobility: Contact guard assistance (vc)    ADL Assessment:  Feeding: Independent  Oral Facial Hygiene/Grooming: Contact guard assistance  Bathing: Minimum assistance  Upper Body Dressing: Supervision  Lower Body Dressing: Minimum assistance  Toileting: Contact guard assistance    ADL Intervention:  Upper Body Dressing Assistance  Dressing Assistance: Supervision  Pullover Shirt: Supervision  Front Opened Shirt: Supervision    Lower Body Dressing Assistance  Dressing Assistance: Minimum assistance  Underpants: Minimum assistance  Pants With Elastic Waist: Minimum assistance  Socks:  Moderate assistance  Leg Crossed Method Used: No  Position Performed: Seated in chair  Cues: Verbal cues provided;Visual cues provided    Toileting  Toileting Assistance: Contact guard assistance  Bladder Hygiene: Contact guard assistance  Clothing Management: Contact guard assistance    Cognitive Retraining  Safety/Judgement: Awareness of environment;Decreased awareness of need for safety    Pain:  Pain level pre-treatment: 0/10  Pain level post-treatment: 0/10  Pain Intervention(s): Medication administer by Nursing (see MAR); Rest, Ice, Repositioning   Response to intervention: Nurse notified, see doc flow     Activity Tolerance:   Fair. Patient able to stand ~5 minute(s). Patient able to complete ADLs with intermittent rest breaks. Patient limited by pain, strength, ROM. Patient unsteady. Please refer to the flowsheet for vital signs taken during this treatment. After treatment:   [x]  Patient left in no apparent distress sitting up in chair  []  Patient sitting on EOB  []  Patient left in no apparent distress in bed  [x]  Call bell left within reach  [x]  Nursing notified  [x]  Caregiver present  [x]  Ice applied  []  SCD's on while back in bed  [] Bed alarm activated    COMMUNICATION/EDUCATION:   Communication/Collaboration:  [x]       Role of Occupational Therapy in the acute care setting. [x]      Home safety education was provided and the patient/caregiver indicated understanding. [x]      Patient/family have participated as able in goal setting and plan of care. [x]      Patient/family agree to work toward stated goals and plan of care. []      Patient understands intent and goals of therapy, but is neutral about his/her participation. []      Patient is unable to participate in plan of care at this time. Thank you for this referral.  Samir Bustamante, OTR/L  Time Calculation: 32 mins    Eval Complexity: History: MEDIUM Complexity : Expanded review of history including physical, cognitive and psychosocial  history ; Examination: MEDIUM Complexity : 3-5 performance deficits relating to physical, cognitive , or psychosocial skils that result in activity limitations and / or participation restrictions; Decision Making:MEDIUM Complexity : Patient may present with comorbidities that affect occupational performnce.  Miniml to moderate modification of tasks or assistance (eg, physical or verbal ) with assesment(s) is necessary to enable patient to complete evaluation

## 2021-11-02 NOTE — HOME CARE
Home care was pre arranged with Tobi Barrett. Orders noted and referral processed.     Sheila Gu RN, BSN  [employer]   [phone]

## 2021-11-02 NOTE — PROGRESS NOTES
1040  Same Day Discharge     - Patient arrives to unit at this time. Dual skin assessment completed with AGUSTINA Jaffe rn, no abnormalities noted other than surgical incision to left knee . mepilex silver dressing CDI. Assumed care of pt at this time. Assessment complete. Pt alert and oriented x 4. Shows no sign of distress. Fall risk arm band in place. Denies SOB and chest pain. Pt lungs clear bilaterally. Cap refill  less than 3 seconds. Pt denies numbness and tingling to all extremities. Stated pain 0/10. Pt has 18 G IV to R forearm. Patient oriented to room to include use of call bell, meal ordering, and use of incentive spirometer, patient able to get IS to 1000. Patient instructed to order lunch and given explanation of home for dinner plan. Phone and call bell left within reach. Educated on pain medication availability and possible side effects, as well as need to call for assistance before getting out of bed. Patient verbalized understanding.      Symptoms present on arrival:  [] Pain 0/10   [] Medicated  [] Nausea   [] Medicated   [] Hypotension/Hypertension   [] Provider notified

## 2021-11-02 NOTE — INTERVAL H&P NOTE
Update History & Physical 
 
The Patient's History and Physical of October 28, 2021 was reviewed with the patient and I examined the patient. There was no change. The surgical site was confirmed by the patient and me. Plan:  The risk, benefits, expected outcome, and alternative to the recommended procedure have been discussed with the patient. Patient understands and wants to proceed with the procedure.  
 
Electronically signed by Collins Stockton MD on 11/2/2021 at 7:23 AM

## 2021-11-02 NOTE — ANESTHESIA PREPROCEDURE EVALUATION
Relevant Problems   No relevant active problems       Anesthetic History     PONV          Review of Systems / Medical History  Patient summary reviewed, nursing notes reviewed and pertinent labs reviewed    Pulmonary  Within defined limits            Pertinent negatives: No COPD, asthma and sleep apnea     Neuro/Psych   Within defined limits        Pertinent negatives: No seizures and CVA   Cardiovascular    Hypertension            Pertinent negatives: No past MI and CHF       GI/Hepatic/Renal  Within defined limits           Pertinent negatives: No liver disease and renal disease   Endo/Other    Diabetes    Arthritis     Other Findings              Physical Exam    Airway  Mallampati: I  TM Distance: 4 - 6 cm  Neck ROM: normal range of motion   Mouth opening: Normal     Cardiovascular    Rhythm: regular  Rate: normal         Dental    Dentition: Poor dentition     Pulmonary  Breath sounds clear to auscultation               Abdominal  GI exam deferred       Other Findings            Anesthetic Plan    ASA: 2  Anesthesia type: general      Post-op pain plan if not by surgeon: peripheral nerve block single    Induction: Intravenous  Anesthetic plan and risks discussed with: Patient

## 2021-11-02 NOTE — ROUTINE PROCESS
TRANSFER - IN REPORT: 
 
Verbal report received from maine avalos rn(name) on Rob Axel Elder  being received from pacu(unit) for routine post - op Report consisted of patients Situation, Background, Assessment and  
Recommendations(SBAR). Information from the following report(s) SBAR, Kardex, [facility] and Recent Results was reviewed with the receiving nurse. Opportunity for questions and clarification was provided. Assessment completed upon patients arrival to unit and care assumed.

## 2021-11-02 NOTE — OP NOTES
[address] [facility]  Total Knee Arthroplasty    Patient: Aldo Schreiber MRN: [id number]  SSN: [id number]    YOB: 1945  Age: 66 y.o. Sex: male      Date of Surgery: 11/2/2021   Preoperative Diagnosis: LEFT KNEE OSTEOARTHRITIS   Postoperative Diagnosis: LEFT KNEE OSTEOARTHRITIS   Location: [facility]  Surgeon: Eugenia Hoffman MD  Assistant: Alo Bergeron PA-C    Anesthesia: General and adductor canal Nerve Block    Procedure: Total Knee Arthroplasty:   The complexity of the total joint surgery requires the use of a first assistant for positioning, retraction and assistance in closure. Tourniquet Time: Tourniquet not used. Estimated Blood Loss: Less than 100cc     Implants:   Implant Name Type Inv. Item Serial No.  Lot No. LRB No. Used Action   CEMENT BNE 40GM FULL DOSE PMMA W/O ANTIBIO HI VISC N RADPQ - [id number]  CEMENT BNE 40GM FULL DOSE PMMA W/O ANTIBIO HI VISC N RADPQ  [id number] [id number] Left 1 Implanted   INSERT TIB SZ 4 [id number] UCONG BKS E VITALIZE - SN/A  INSERT TIB SZ 4 [id number] UCONG BKS E VITALIZE N/A [employer] [id number] Left 1 Implanted   COMPONENT FEM SZ 4 L KNEE NP CRUCE RET RAQUEL BKS TRIMAX - SN/A  COMPONENT FEM SZ 4 L KNEE NP CRUCE RET RAQUEL BKS TRIMAX N/A [employer]TH[employer] [id number] Left 1 Implanted   TRAY TIB SZ 4 NP A BAL KNEE - SN/A  TRAY TIB SZ 4 NP A BAL KNEE N/A [employer]TH[employer] [id number] Left 1 Implanted   COMPONENT PAT H32MM STD E VITALIZE CHAITANYA EVELYNE RND BAL SYS - SN/A  COMPONENT PAT H32MM STD E VITALIZE CHAITANYA EVELYNE RND BAL SYS N/A [employer]TH[employer] [id number] Left 1 Implanted        Specimens: None    Additional Findings: None     Complications: none    Body Mass Index: Body mass index is 21.5 kg/m².     Procedure Detail:  Prior to the surgery the patient was administered a femoral nerve block in the preoperative holding area by the anesthesiologist. Aldo Schreiber was brought to the operating room and positioned on the operating table. He was anesthetized with anesthesia. Intravenous antibiotics were administered. Prior to the incision being made a timeout was called identifying the patient, procedure, operative side, and surgeon. A pneumatic tourniquet was placed about the limb and the left leg was prepped and draped in the usual sterile manner. The tourniquet was not inflated throughout the case. A midline anterior incision made over the knee. The incision was carried down through the subcutaneous tissue to the underlying capsule. A medial parapatellar capsular incision was performed. The medial capsular flap was carefully elevated around to the posterior medial corner protecting the medial collateral ligaments and the fibers. The patella was sized with a caliper, and approximately 10-12 mm was resected with an oscillating saw allowing the patella to be slid into the lateral gutter. It was not everted throughout the case. Our attention was first turned to the distal femur and using intermedullary instrumentation, a 5-degree valgus cut was on the distal end of the femur. The distal end of the femur was sized to a size 4N femoral component. Pins were inserted through the sizer and the corresponding 4-in-1 block was slid into place and pinned for stability. Anterior and posterior and chamfer cuts were made to accommodate the femoral component. The medial and lateral menisci were excised as were the anterior cruciate ligaments. Our attention was then turned to the tibia. Using extramedullary instrumentation, a 3-degree cut was made on the proximal end of the tibia. A spacer block was placed to show gaps had been balanced and a size 4  tibial base plate was placed on the tibia and pinned into place. Intramedullary reaming guide was placed on the tibia and the appropriate reamer was used followed by the keel punch to complete the preparation of the tibia.  A trial femoral component was then impacted on to the distal end of the femur. Trial reduction was then performed with incremental size trial bearing surfaces. The orthodevelopment BKS trimax UC 11mm bearing surface was inserted and allowed for full extension, good medial, lateral stability at 90 degrees of flexion, especially medially. Our attention was then turned to the patella. The patella was sized to a 32mm patella. The guide was pinned, placed over patella, 3 holes were drilled. Trial patella button was inserted and the patella was reduced in the knee. The patella tracked normally using no-touch technique. The trial components were then all removed. The real components were opened on the back. The cut surfaces of the bone were prepared using the PulsaVac lavage. Prior to this, the Aquamantys was used to cauterize any soft tissue bleeding. 40 grams of [employer] HV cement was mixed. The femoral and tibial components  and patellar component was cemented into place. Excess cement was removed from around the edge of bone using plastic curette. Once the cement had hardened, the knee was placed through range of motion and noted to be stable as mentioned above with the trail components. The wound was dry, therefore no drain was used. The operative knee was injected with 20 cc of exparel. The knee was then soaked with a diluted betadine solution for approximately 3 min. This was then thoroughly irrigated. The capsular layer was closed using a #2 stratafix suture with the knee flexed 90 degrees, while subcutaneous layers were closed using 2-0 Vicryl suture. Finally the skin was closed using Prineo, which were applied in occlusive fashion and sterile bandage applied. All sponge and needle counts were correct. He was taken to the recovery room extubated in stable condition.     Signed By: Dianna Wiley MD     November 2, 2021

## 2021-11-02 NOTE — PROGRESS NOTES
1035  Pt very drowsy but able to ambulate from stretcher to recliner without complications     0703  Dual AVS reviewed with AGUSTINA Jaffe rn. All medications reviewed individually with patient. Opportunities for questions and concerns provided. Patient to be discharged via (mode of transport ie. Car, ambulance or air transport) car. Patient's arm band appropriately discarded.       IV remain as pt needs to see PT and void post op    1219  Pt ambulated from recliner to bathroom voided post op and returned to chair without complication     1929  PT/OT in to see pt     1351  Pt passed PT and voided post op pt ready for d/c IV to be removed

## 2021-11-02 NOTE — PROGRESS NOTES
Problem: Mobility Impaired (Adult and Pediatric)  Goal: *Acute Goals and Plan of Care (Insert Text)  Description: PT goals to be met in 1 day:  Pt will be able to perform supine<>sit SBA for transfers at home. Pt will be able to perform sit<>stand SBA for increased ability to transfer at home safely. Pt will be able to participate in gt training >100' w/ RW, WBAT, GB and CGA/SBA for improved ability in home upon d/c. Pt will be able to perform stair training step to pattern, B/U rail and CGA to obtain safe entry into home upon d/c. Pt will be educated regarding HEP per MD protocol for optimal AROM/strength outcomes. Note: [x]  Patient has met MD mobilization critieria for d/c home   [x]  Recommend HH with 24 hour adult care   []  Benefit from additional acute PT session to address:      PHYSICAL THERAPY EVALUATION    Patient: Jenelle El (19 y.o. male)  Date: 11/2/2021  Primary Diagnosis: Localized osteoarthritis of left knee [M17.12]  Procedure(s) (LRB):  LEFT TOTAL KNEE REPLACEMENT (Left) Day of Surgery   Precautions:   Fall, WBAT    PLOF: Independent    ASSESSMENT :  Based on the objective data described below, the patient presents with decreased mobility in regards to bed mobility, transfers, gt quality and tolerance, balance, stair negotiation and safety due to L TKA surgery. Decreased AROM of L foot due to cont block effects, dec strength of L knee, dec sensation of L knee/foot also impacting pt functional mobility. Pt rating pain on numerical pain scale pre/post and during session 0/10. Pt sitting in recliner upon arrival.  Pt and wife ed regarding mobility safety, WB, HEP, ice application/use, elevation, environmental safety and home safe techniques. Pt able to perform sit<>stand w/ CGA. Safety vc required throughout session to reinforce safety. Pt able to participate in gt training using RW, GB, WBAT and CGA w/ antalgic gt pattern and L foot drop.   Pt was able to participate in stair training using step to pattern, B rails and CGA. Answered questions by pt and wife in regards to PT and mobility. Pt left sitting in recliner w/ all needs within reach and ice pack to L knee. Nurse Meghan aware of session and outcomes. Recommend HHPT with responsible adult care at least 24 hours upon hospital d/c. Patient will benefit from skilled intervention to address the above impairments. Patient's rehabilitation potential is considered to be Good  Factors which may influence rehabilitation potential include:   []         None noted  []         Mental ability/status  []         Medical condition  []         Home/family situation and support systems  []         Safety awareness  [x]         Pain tolerance/management  []         Other:      PLAN :  Recommendations and Planned Interventions:   [x]           Bed Mobility Training             []    Neuromuscular Re-Education  [x]           Transfer Training                   []    Orthotic/Prosthetic Training  [x]           Gait Training                          [x]    Modalities  [x]           Therapeutic Exercises           [x]    Edema Management/Control  [x]           Therapeutic Activities            [x]    Family Training/Education  [x]           Patient Education  []           Other (comment):    Frequency/Duration: Patient will be followed by physical therapy 1-2 times per day/4-7 days per week to address goals. Discharge Recommendations: Home Health  Further Equipment Recommendations for Discharge: N/A     SUBJECTIVE:   Patient stated I am so sleepy.     OBJECTIVE DATA SUMMARY:     Past Medical History:   Diagnosis Date    Arthritis     Back pain     Bladder stone     BPH with obstruction/lower urinary tract symptoms     Cancer ([facility] [address] 2013    skin cancer neck     Chronic pain     hip    Colon polyp     Diabetes (HCC)     diet controlled and exercise    ED (erectile dysfunction)     Elevated PSA     Erectile dysfunction     Gout     States in his hand and recently on medication. Hydrocele     Hypertension     Lower urinary tract symptoms (LUTS)     Nocturia     Osteoarthritis of left hip 12/19/2012    Osteoarthritis of right hip 2/1/2016    PONV (postoperative nausea and vomiting)     Primary localized osteoarthritis of left knee 10/28/2021    Urinary frequency     UTI (urinary tract infection)     States he is on medication and does not remember the name. He is going to contact Dr. Zac Silver for questions. Past Surgical History:   Procedure Laterality Date    HX COLONOSCOPY  01/09/2017    HX HIP REPLACEMENT Left     HX HIP REPLACEMENT Right 2/2/16    HX KNEE ARTHROSCOPY Left 2005    HX LAP CHOLECYSTECTOMY  12/3/15    HX MOHS PROCEDURES Left 2009    HX UROLOGICAL  07/24/2007    PNBx. Benign. HX UROLOGICAL  01/02/2018    [facility]. Cystoscopy with Large Bladder stones Cystolithopaxy and PVP. Dr. Dave Jackson. Barriers to Learning/Limitations: yes;  anesthesia  Compensate with: Visual Cues, Verbal Cues, and Tactile Cues  Home Situation:  Home Situation  Home Environment: Private residence  # Steps to Enter: 2  Rails to Enter: No  One/Two Story Residence: Two story  # of Interior Steps: 13  Interior Rails: Right  Lift Chair Available: No  Living Alone: No  Support Systems: Spouse/Significant Other  Patient Expects to be Discharged to[de-identified] [employer]  Current DME Used/Available at Home: Commode, bedside, Walker, rolling, Wheelchair  Tub or Shower Type: Tub/Shower combination  Critical Behavior:  Neurologic State: Alert;Drowsy  Orientation Level: Oriented to person;Oriented to place;Oriented to situation  Cognition: Follows commands  Safety/Judgement: Awareness of environment  Psychosocial  Patient Behaviors: Calm; Cooperative  Family  Behaviors: Calm;Supportive  Purposeful Interaction: Yes  Skin Condition/Temp: Dry;Warm  Family  Behaviors: Calm;Supportive  Skin Integrity: Incision (comment) (L knee)  Skin Integumentary  Skin Color: Appropriate for ethnicity  Skin Condition/Temp: Dry;Warm  Skin Integrity: Incision (comment) (L knee)  Strength:    Strength: Generally decreased, functional  Tone & Sensation:   Tone: Normal  Sensation: Impaired (L knee)  Range Of Motion:  Posture:  Functional Mobility:  Bed Mobility:  Scooting: Stand-by assistance  Transfers:  Sit to Stand: Contact guard assistance (vc)  Stand to Sit: Contact guard assistance (vc)  Balance:   Sitting: Intact  Standing: Intact; With support  Ambulation/Gait Training:  Distance (ft): 150 Feet (ft)  Assistive Device: Walker, rolling;Gait belt  Ambulation - Level of Assistance: Contact guard assistance (vc)  Gait Abnormalities: Antalgic;Decreased step clearance; Step to gait  Left Side Weight Bearing: As tolerated  Base of Support: Shift to right  Stance: Left decreased  Speed/Mary: Slow  Step Length: Left shortened;Right shortened  Swing Pattern: Left asymmetrical;Right asymmetrical  Interventions: Safety awareness training; Tactile cues; Verbal cues; Visual/Demos  Stairs:  Number of Stairs Trained: 5  Stairs - Level of Assistance: Contact guard assistance (vc)  Rail Use: Both     Therapeutic Exercises:   Encouraged HEP  Pain:  Pain level pre-treatment: 0/10   Pain level post-treatment: 0/10   Pain Intervention(s) : Medication (see MAR); Rest, Ice, Repositioning  Response to intervention: Nurse notified, See doc flow    Activity Tolerance:   Fair   Please refer to the flowsheet for vital signs taken during this treatment. After treatment:   [x]         Patient left in no apparent distress sitting up in chair  []         Patient left in no apparent distress in bed  [x]         Call bell left within reach  [x]         Nursing notified  []         Caregiver present  []         Bed alarm activated  []         SCDs applied    COMMUNICATION/EDUCATION:   [x]         Role of Physical Therapy in the acute care setting. [x]         Fall prevention education was provided and the patient/caregiver indicated understanding.   [x] Patient/family have participated as able in goal setting and plan of care. [x]         Patient/family agree to work toward stated goals and plan of care. []         Patient understands intent and goals of therapy, but is neutral about his/her participation. []         Patient is unable to participate in goal setting/plan of care: ongoing with therapy staff.  []         Other:     Thank you for this referral.  Shena Cunningham, PT   Time Calculation: 34 mins      Eval Complexity: History: HIGH Complexity :3+ comorbidities / personal factors will impact the outcome/ POC Exam:MEDIUM Complexity : 3 Standardized tests and measures addressing body structure, function, activity limitation and / or participation in recreation  Presentation: LOW Complexity : Stable, uncomplicated  Clinical Decision Making:Low Complexity    Overall Complexity:LOW

## 2021-11-02 NOTE — ANESTHESIA PROCEDURE NOTES
Peripheral Block    Start time: 11/2/2021 7:17 AM  End time: 11/2/2021 7:20 AM  Performed by: Yusef Estrada MD  Authorized by: Yusef Estrada MD       Pre-procedure: Indications: at surgeon's request and post-op pain management    Preanesthetic Checklist: patient identified, risks and benefits discussed, site marked, timeout performed, anesthesia consent given and patient being monitored    Timeout Time: 07:17 EDT          Block Type:   Block Type:  IPACK  Laterality:  Left  Monitoring:  Standard ASA monitoring, responsive to questions, oxygen, continuous pulse ox, frequent vital sign checks and heart rate  Injection Technique:  Single shot  Procedures: ultrasound guided    Patient Position: supine  Prep: chlorhexidine    Location:  Lower thigh  Needle Type:  Stimuplex  Needle Gauge:  20 G  Needle Localization:  Ultrasound guidance  Medication Injected:  Ropivacaine (PF) (NAROPIN) 5 mg/mL (0.5 %) injection, 15 mL  Med Admin Time: 11/2/2021 7:20 AM    Assessment:  Number of attempts:  1  Injection Assessment:  Incremental injection every 5 mL, negative aspiration for CSF, no paresthesia, ultrasound image on chart, local visualized surrounding nerve on ultrasound, negative aspiration for blood and no intravascular symptoms  Patient tolerance:  Patient tolerated the procedure well with no immediate complications  Ultrasound guidance was used to view and verify medication disbursement and was also used for needle placement.

## 2021-11-02 NOTE — PERIOP NOTES
Paged Dr. Precious Cherry for patient sign out. Patient meets criteria for transfer to the next phase of care.

## 2021-11-02 NOTE — ANESTHESIA POSTPROCEDURE EVALUATION
Post-Anesthesia Evaluation and Assessment    Cardiovascular Function/Vital Signs  Visit Vitals  BP (!) 149/64   Pulse 65   Temp 36.6 °C (97.9 °F)   Resp 17   Ht 5' 5\" (1.651 m)   Wt 58.6 kg (129 lb 3.2 oz)   SpO2 97%   BMI 21.50 kg/m²       Patient is status post Procedure(s):  LEFT TOTAL KNEE REPLACEMENT. Nausea/Vomiting: Controlled. Postoperative hydration reviewed and adequate. Pain:  Pain Scale 1: Numeric (0 - 10) (11/02/21 0956)  Pain Intensity 1: 0 (11/02/21 0956)   Managed. Neurological Status:   Neuro (WDL): Exceptions to WDL (11/02/21 0956)   At baseline. Mental Status and Level of Consciousness: Baseline and appropriate for discharge. Pulmonary Status:   O2 Device: None (Room air) (11/02/21 1002)   Adequate oxygenation and airway patent. Complications related to anesthesia: None    Post-anesthesia assessment completed. No concerns. Patient has met all discharge requirements.     Signed By: Maggy Hernandez MD    November 2, 2021

## 2021-11-03 ENCOUNTER — HOME CARE VISIT (OUTPATIENT)
Dept: HOME HEALTH SERVICES | Facility: HOME HEALTH | Age: 76
End: 2021-11-03

## 2021-11-03 ENCOUNTER — HOME CARE VISIT (OUTPATIENT)
Dept: SCHEDULING | Facility: HOME HEALTH | Age: 76
End: 2021-11-03
Payer: MEDICARE

## 2021-11-03 VITALS
SYSTOLIC BLOOD PRESSURE: 130 MMHG | OXYGEN SATURATION: 97 % | DIASTOLIC BLOOD PRESSURE: 62 MMHG | TEMPERATURE: 97.5 F | HEART RATE: 76 BPM | RESPIRATION RATE: 12 BRPM

## 2021-11-03 PROCEDURE — 400018 HH-NO PAY CLAIM PROCEDURE

## 2021-11-03 PROCEDURE — 3331090002 HH PPS REVENUE DEBIT

## 2021-11-03 PROCEDURE — A6213 FOAM DRG >16<=48 SQ IN W/BDR: HCPCS

## 2021-11-03 PROCEDURE — G0151 HHCP-SERV OF PT,EA 15 MIN: HCPCS

## 2021-11-03 PROCEDURE — 3331090001 HH PPS REVENUE CREDIT

## 2021-11-03 PROCEDURE — 400013 HH SOC

## 2021-11-03 NOTE — HOME HEALTH
PMHx: H+P per hospital DISCHARGE DIAGNOSIS: Status post LEFT TOTAL KNEE ARTHROPLASTY  HISTORY OF PRESENT ILLNESS: The patient is a 66y.o. year-old male  with ongoing left knee pain secondary to osteoarthritis of his left knee. The patient's pain has persisted and progressed despite conservative treatments and therapies. The patient has at this time opted for surgical intervention. Past Medical History:  o Arthritis    o Back pain    o Bladder stone    o BPH with obstruction/lower urinary tract symptoms    o Cancer ([address] 2013    skin cancer neck   o Chronic pain      hip  o Colon polyp    o Diabetes (HCC)      diet controlled and exercise  o ED (erectile dysfunction)    o Elevated PSA    o Erectile dysfunction    o Gout      States in his hand and recently on medication. o Hydrocele    o Hypertension    o Lower urinary tract symptoms (LUTS)    o Nocturia    o Osteoarthritis of left hip 12/19/2012  o Osteoarthritis of right hip 2/1/2016  o PONV (postoperative nausea and vomiting)    o Primary localized osteoarthritis of left knee 10/28/2021  o Urinary frequency    o UTI (urinary tract infection)      States he is on medication and does not remember the name. He is going to contact Dr. Valenzuela Prnéstor for questions. PAST SURGICAL HISTORY:   o HX COLONOSCOPY   01/09/2017  o HX HIP REPLACEMENT Left    o HX HIP REPLACEMENT Right 2/2/16  o HX KNEE ARTHROSCOPY Left 2005  o HX LAP CHOLECYSTECTOMY   12/3/15  o HX MOHS PROCEDURES Left 2009  o HX UROLOGICAL   07/24/2007    PNBx. Benign. o HX UROLOGICAL   01/02/2018    [facility]. Cystoscopy with Large Bladder stones Cystolithopaxy and PVP. Dr. Malachi Jacobs. SUBJECTIVE: I am glad to be home. I slept well last night   LIVING SITUATION: Pt lives with wife in a multilevel home with 3 steps at side entrence without railings. Pt is currently staying in guestroom on first floor and there is a full bathroom on fist floor.  There is a fligth of steps with 1 HR to primary bedroom and bathroom on the second floor. REQUIRES CAREGIVER ASSISTANCE FOR: transportation, medications, ADLS,IADLS   PLOF: Pt was I with amb without A DEV. Pt was I with dressing and bathing. Pt was driving   MEDICATIONS REVIEWED AND UPDATED: Medications reconciled and all medications are available in the home this visit. The following education was provided regarding medications, medication interactions, and look a like medications. Medications are effective at this time. no severe interactions noted. Pt educated to take dilaudid as prescribed and educated in bleeding precuations as he is on aspirin as blood thiner  NEXT MD APPT: Dr Renetta Denney  11/17/21  ROM: L knee in sitting -9-95 degrees  STRENGTH: L hip flexors -3/5 L quads and hamstrings 3/5 L DF/PF 5/5  R hip flexors, quads and hamstrings 5/5   WOUNDS:L knee bandages clean dry and intact. No drainage noted . Swelling noted L knee. No signs or symptoms of infection noted  Next dressing change is 11/9/21. Per MD orders Change Mepilex dressing on day that is written on dressing. Next dressing change should be within 3-5 days. However, if patient is making follow-up visit with Dr. Renetta Denney the day after the dressing is due, leave dressing on. If drainage noted, change PRN  BED MOBILITY:supine to sit and sit to supine with SBA with MOD vc as pt was instructed to use a belt for L LE managment. After instruction pt demonstated proper technique and sequencing   TRANSFERS:sit to stand from bed x 2 from commode and from kitchen chair with RW with SBA with MIn vc needed for hand placement for safety. Pt extends L LE fwd on all transfers  GAIT: Pt amb 30 ft x2 with RW with step too antalgic gait pattern with decreased B step length and decreased L HS at initial contact slow tamara. Decreased L knee flexion during swing phase of gait. Pt has a downward gaze. Pt amb with SBA  STAIRS:NA  BALANCE: Pt scored 13/28 on Tinetti Balance Assessment placing pt at high risk for falls. PATIENT EDUCATION PROVIDED THIS VISIT: safety, HEP, walking, deep breathing, Educated pt to use RW at all times when amb for safety. Educated pt to  elevate L LE throughout the day. Ice to L LE 20 min on 1 HR off throughout the day for pain relief and to contol swelling  HEP consisting of:  1. Walking every hour during the day with RW  2. supine therex L LE AP,QS,HS,SAQ,SLR Seated L knee flexion 3 daily x 10  Written HEP issued, patient/caregiver verbalized understanding. CONTINUED NEED FOR THE FOLLOWING SKILLS: HH PT is medically necessary to  address pain, decreased ROM, decreased strength, increased swelling, impaired bed mobility, decreased independence with functional transfers, impaired gait, impaired stair negotiation, and impaired balance in order to improve functional independence, quality of life, return to PLOF, and reduce the risk for falls. ASSESSMENT: Pt presents with decreased strength and AROM and stength  L LE. A requied with all bed mobility and transfers. Pt is amb limited distances with RW with A. Pt presents with decreased dynamic standing balance   PLAN:  Pt will be seen to establish and upgrade HEP. Gait training with the least restrictive A DEV on flat and uneven surfaces. Bed mobility training, transfer training. Stair training. Dynamic standing balance re -education. Reinforece general safety precautions. DISCHARGE PLANNING DISCUSSED: Discharge to self and family under MD supervision once all goals have been met or patient has reached max potential. Patient/caregiver verbalized understanding. Dr Shannon Chawla office notified that PT Admit completed 11/3/21. Medications reconciled and all medications are available in the home this visit. The following education was provided regarding medications, medication interactions, and look a like medications. Medications are effective at this time. no severe interactions noted.  Pt educated to take dilaudid as prescribed and educated in bleeding precuations as he is on aspirin as blood thinner. Pt presents with decreased strength and AROM and stength  L LE. A requied with all bed mobility and transfers. Pt is amb limited distances with RW with A. Pt presents with decreased dynamic standing balance. Pt will be seen to establish and upgrade HEP. Gait training with the least restrictive A DEV on flat and uneven surfaces. Bed mobility training, transfer training. Stair training. Dynamic standing balance re -education. Reinforece general safety precautions.

## 2021-11-03 NOTE — Clinical Note
Dr Amanuel Alejandra office notified that PT Admit completed 11/3/21. Medications reconciled and all medications are available in the home this visit. The following education was provided regarding medications, medication interactions, and look a like medications. Medications are effective at this time. no severe interactions noted. Pt educated to take dilaudid as prescribed and educated in bleeding precuations as he is on aspirin as blood thinner. Pt presents with decreased strength and AROM and stength  L LE. A requied with all bed mobility and transfers. Pt is amb limited distances with RW with A. Pt presents with decreased dynamic standing balance. Pt will be seen to establish and upgrade HEP. Gait training with the least restrictive A DEV on flat and uneven surfaces. Bed mobility training, transfer training. Stair training. Dynamic standing balance re -education. Reinforece general safety precautions.

## 2021-11-04 ENCOUNTER — HOME CARE VISIT (OUTPATIENT)
Dept: SCHEDULING | Facility: HOME HEALTH | Age: 76
End: 2021-11-04
Payer: MEDICARE

## 2021-11-04 ENCOUNTER — HOME CARE VISIT (OUTPATIENT)
Dept: HOME HEALTH SERVICES | Facility: HOME HEALTH | Age: 76
End: 2021-11-04
Payer: MEDICARE

## 2021-11-04 PROCEDURE — 3331090001 HH PPS REVENUE CREDIT

## 2021-11-04 PROCEDURE — G0157 HHC PT ASSISTANT EA 15: HCPCS

## 2021-11-04 PROCEDURE — 3331090002 HH PPS REVENUE DEBIT

## 2021-11-05 ENCOUNTER — HOME CARE VISIT (OUTPATIENT)
Dept: SCHEDULING | Facility: HOME HEALTH | Age: 76
End: 2021-11-05
Payer: MEDICARE

## 2021-11-05 PROCEDURE — 3331090002 HH PPS REVENUE DEBIT

## 2021-11-05 PROCEDURE — 3331090001 HH PPS REVENUE CREDIT

## 2021-11-05 PROCEDURE — G0157 HHC PT ASSISTANT EA 15: HCPCS

## 2021-11-06 PROCEDURE — 3331090002 HH PPS REVENUE DEBIT

## 2021-11-06 PROCEDURE — 3331090001 HH PPS REVENUE CREDIT

## 2021-11-07 PROCEDURE — 3331090002 HH PPS REVENUE DEBIT

## 2021-11-07 PROCEDURE — 3331090001 HH PPS REVENUE CREDIT

## 2021-11-08 VITALS
DIASTOLIC BLOOD PRESSURE: 80 MMHG | OXYGEN SATURATION: 98 % | OXYGEN SATURATION: 98 % | DIASTOLIC BLOOD PRESSURE: 58 MMHG | TEMPERATURE: 98.4 F | HEART RATE: 86 BPM | TEMPERATURE: 98.3 F | SYSTOLIC BLOOD PRESSURE: 136 MMHG | HEART RATE: 73 BPM | SYSTOLIC BLOOD PRESSURE: 120 MMHG

## 2021-11-08 PROCEDURE — 3331090002 HH PPS REVENUE DEBIT

## 2021-11-08 PROCEDURE — 3331090001 HH PPS REVENUE CREDIT

## 2021-11-08 NOTE — HOME HEALTH
Subjective: Pt rates his pain a 6/10. Pt denies any problems with constipation. Pt asks if the stockings need to come off at night. He reports that his wife helps with the stockings and she is having trouble getting them off. Future MD appointments: 11/17/21 with Sebas Sutherland MD  Caregiver involvement/assistance needed for: Pt lives with spouse who helps with ADLs, errands and meals. Objective: See interventions. Pt and spouse instructed how to use a plastic bag to don compression stockings. Assessment of Progress toward goals: Pt demonstrates good progress with bed mobility with the use of a belt to assist LLE. Continued need for the following skills: There is a continued need for skilled PT to instruct pt in HEP, proper gait traning and AROM. Communications with all co-treating staff regarding POC through email. Equipment Needs:  Plan for Next visit: Address HEP, transfer training, gait training and AROM to improve pt's ability to perform ADLs with less assistance. Frequency: 3 w 2, 2 w 1. Pt has one remaining visit this week. The Following Discharge Planning was Discussed with the Pt/Caregiver: Pt to [place] [id number] [address] PT until goals are met or pt has acheived david benefit.

## 2021-11-08 NOTE — HOME HEALTH
Subjective: When asked if the bag tricked helped with getting stockings on, the pt responded not really. Pt expresses concern with taking pain meds and it causing naussea. Future MD appointments: 11/17/21  with Dale Stratton MD  Caregiver involvement/assistance needed for: Pt lives in a one story house with his wife who helps with errands, meals and transportation. Objective: See interventions. Assessment of Progress toward goals: Pt is progressing with improved  bed mobility and transfer training. Pt had difficulty with either pain meds or PROM as he develops naussea at the end of the PT session. Continued need for the following skills: There is a continued need for skilled PT to progress HEP, assess gait training and assess AROM. Communications with all co-treating staff regarding pt status/progress during staff meeting and email. Equipment Needs:  Plan for Next visit: Assess pt's response to PT. Determine if pt's pain is controlled without naussea. Frequency: 3 w 2, 2 w 1. Pt has three visits next week. The Following Discharge Planning was Discussed with the Pt/Caregiver: Pt to receive Home Health PT until goals are met or max benefit has been achieved.

## 2021-11-09 ENCOUNTER — HOME CARE VISIT (OUTPATIENT)
Dept: SCHEDULING | Facility: HOME HEALTH | Age: 76
End: 2021-11-09
Payer: MEDICARE

## 2021-11-09 VITALS
TEMPERATURE: 99.1 F | OXYGEN SATURATION: 96 % | HEART RATE: 85 BPM | SYSTOLIC BLOOD PRESSURE: 128 MMHG | DIASTOLIC BLOOD PRESSURE: 62 MMHG

## 2021-11-09 PROCEDURE — G0157 HHC PT ASSISTANT EA 15: HCPCS

## 2021-11-09 PROCEDURE — 3331090002 HH PPS REVENUE DEBIT

## 2021-11-09 PROCEDURE — 3331090001 HH PPS REVENUE CREDIT

## 2021-11-10 PROCEDURE — 3331090002 HH PPS REVENUE DEBIT

## 2021-11-10 PROCEDURE — 3331090001 HH PPS REVENUE CREDIT

## 2021-11-10 NOTE — HOME HEALTH
Subjective: Pt and spouse ask if the pt still need to wear the stockings. Pt and spouse are unable to don them even with the aid of a plastic bag. They are instructed that the pt is to wear them until he has his follow up with MD. Pt c/o his inability to push himself. He also asks how is he to perform all of the exercises and rest and elevate his LLE. Pt reports that knees are hard. Pt constantly asks for help with minor tasks. Pt and spouse instructed that the pt should do more for himself. When asked, pt's spouse reports that even though the pt may not be pushing himself, he is doing the best that he can. Future MD appointments: 11/17/21 at 14:20 Akanksha Whittington. Caregiver involvement/assistance needed for: Pt lives on the first floor of his home with his wife, who helps with errands, meals and transportation. Objective: See interventions. Assessment of Progress toward goals: Pt is progressing slowly with L knee EXT, strength and transfer training to reduce dependency on other people. Continued need for the following skills: There is a continued need for skilled PT to motivate pt to push himself and assess progress with AROM, gait/transfer training. Communications with all co-treating staff regarding pt status/progress during staff meeting/phone call/text/email. Equipment Needs:  Plan for Next visit: Assess pt's response to PT next visit. Continue with strengthening, AROM, gait and stair training to improve ability to get out into the community. Frequency: 3 w 2, 2 w 1. Pt has two remaining visits this week. The Following Discharge Planning was Discussed with the Pt/Caregiver: Pt to receive Home Health PT until goals are met or max benefit has been achieved.

## 2021-11-11 ENCOUNTER — HOME CARE VISIT (OUTPATIENT)
Dept: SCHEDULING | Facility: HOME HEALTH | Age: 76
End: 2021-11-11
Payer: MEDICARE

## 2021-11-11 VITALS — DIASTOLIC BLOOD PRESSURE: 60 MMHG | RESPIRATION RATE: 12 BRPM | SYSTOLIC BLOOD PRESSURE: 148 MMHG | HEART RATE: 96 BPM

## 2021-11-11 PROCEDURE — 3331090002 HH PPS REVENUE DEBIT

## 2021-11-11 PROCEDURE — G0157 HHC PT ASSISTANT EA 15: HCPCS

## 2021-11-11 PROCEDURE — 3331090001 HH PPS REVENUE CREDIT

## 2021-11-11 NOTE — Clinical Note
thanks   ----- Message -----  From: Kiara Scott PTA  Sent: 11/11/2021  11:51 PM EST  To: Zohra Stevens, PT      Pt is having difficulty managing his pain to participate in PT during PT session and on his own. Pt's pain cause his anxiety and BP to elevate as high 170/80 at it's highest. Pt is only taking 1 pain med at a time. See pain med below:  HYDROMORPHONE HCL  2MG  TAB      TAKE 1 TO 2 TABLETS BY MOUTH EVERY 4 HOURS AS NEEDED FOR PAIN FOR UP TO 7 DAYS.  MAX DAILY AMOUNT 12 TABLETS

## 2021-11-11 NOTE — Clinical Note
Pt is having difficulty managing his pain to participate in PT during PT session and on his own. Pt's pain cause his anxiety and BP to elevate as high 170/80 at it's highest. Pt is only taking 1 pain med at a time. See pain med below:  HYDROMORPHONE HCL  2MG  TAB      TAKE 1 TO 2 TABLETS BY MOUTH EVERY 4 HOURS AS NEEDED FOR PAIN FOR UP TO 7 DAYS.  MAX DAILY AMOUNT 12 TABLETS

## 2021-11-12 ENCOUNTER — HOME CARE VISIT (OUTPATIENT)
Dept: SCHEDULING | Facility: HOME HEALTH | Age: 76
End: 2021-11-12
Payer: MEDICARE

## 2021-11-12 PROCEDURE — 3331090001 HH PPS REVENUE CREDIT

## 2021-11-12 PROCEDURE — 3331090002 HH PPS REVENUE DEBIT

## 2021-11-12 PROCEDURE — G0157 HHC PT ASSISTANT EA 15: HCPCS

## 2021-11-12 NOTE — HOME HEALTH
Subjective: Pt c/o pain in his R foot and reports that it is swollen, as well. Pt reports that his pain is controlled, but pt has symptoms of sweating and naussea following exercises. Pt's BP is elevated and when asked if he has taken his medications, pt reports that he has not had them yet. Pt also reports that he really hasn't eaten much due to lack of appetite. When asked if he is getting up every 1-2 hours to amb pt states, yes. Future MD appointments: 11/17/21  Caregiver involvement/assistance needed for: Pt lives with spouse on first floor of home. She helps with errands, meals and transportation. Objective: See interventions. Assessment of Progress toward goals: Pt has ecchymosis around his L ankle/foot. There is mild edema in his L foot/ankle and it is overly painful to touch. LLE is not warm, red or swollen. Pt is not progress due to noncompliance with medications which affects his ability to participate fully in PT sessions and HEP on his own. Pt is alway laying on his bed upon arrival. He relies to heavily on others to perform simple tasks instead of doing for himself. Pt's tolerance for pain is low and he does not appear to be challenging himself. Continued need for the following skills: There is a continued need for skilled PT to improve pt's ability to improve strength and perform gait/stair training to reduce dependence on others. Communications with all co-treating staff; includeing MD regarding pt lack of pain control and lack of progress via phone call and case communication. Equipment Needs:  Plan for Next visit: Assess pt's compliance with medication, HEP and gait training. Frequency: 3 w 2, 2 w 1. Pt has one remaining visit this week.    The Following Discharge Planning was Discussed with the Pt/Caregiver: Pt to receive Home Health PT until goals are met or pt has achieved max benefit upon which he will be transferred to Outpt PT.

## 2021-11-13 VITALS
TEMPERATURE: 97.9 F | DIASTOLIC BLOOD PRESSURE: 48 MMHG | SYSTOLIC BLOOD PRESSURE: 102 MMHG | OXYGEN SATURATION: 99 % | HEART RATE: 90 BPM

## 2021-11-13 PROCEDURE — 3331090002 HH PPS REVENUE DEBIT

## 2021-11-13 PROCEDURE — 3331090001 HH PPS REVENUE CREDIT

## 2021-11-14 PROCEDURE — 3331090001 HH PPS REVENUE CREDIT

## 2021-11-14 PROCEDURE — 3331090002 HH PPS REVENUE DEBIT

## 2021-11-14 NOTE — HOME HEALTH
Subjective: Pt states that he is so much better. He is a whole different person than the person a couple of days ago. Pt reports that he is taking the full dosage; which is two pills. Future MD appointments: 11/17/21 Vikki Mullen MD   Caregiver involvement/assistance needed for: Pt lives with wife on the first floor. She helps with errands, meals, medication and transportation. Objective: See interventions. Assessment of Progress toward goals: Pt is progressing well with his ability to fully participate in PT due to improved pain control  Continued need for the following skills: There is a need for skilled PT to improve L knee EXT and LE strength to improve amb and reduce the risk of falls. Call from [address] with Dr. Rabia Matthew off regarding pt status and lack of progress. They are aware of the pt's situation and will call pt and spouse. They will also call pharmacy with script for additional pain meds. Equipment Needs:  Plan for Next visit: Continue to address strength, AROM, and gait/stair training to improve independence with ADLs. Frequency: 3 w 1, 2 w 1. Pt has two visits next week. The Following Discharge Planning was Discussed with the Pt/Caregiver: Pt to receive Home Health PT until goals are met or pt has achieved max benefit.

## 2021-11-15 PROCEDURE — 3331090002 HH PPS REVENUE DEBIT

## 2021-11-15 PROCEDURE — 3331090001 HH PPS REVENUE CREDIT

## 2021-11-16 ENCOUNTER — HOME CARE VISIT (OUTPATIENT)
Dept: SCHEDULING | Facility: HOME HEALTH | Age: 76
End: 2021-11-16
Payer: MEDICARE

## 2021-11-16 PROCEDURE — 3331090002 HH PPS REVENUE DEBIT

## 2021-11-16 PROCEDURE — 3331090001 HH PPS REVENUE CREDIT

## 2021-11-16 PROCEDURE — G0157 HHC PT ASSISTANT EA 15: HCPCS

## 2021-11-17 VITALS
TEMPERATURE: 98.1 F | HEART RATE: 74 BPM | OXYGEN SATURATION: 99 % | DIASTOLIC BLOOD PRESSURE: 68 MMHG | SYSTOLIC BLOOD PRESSURE: 112 MMHG

## 2021-11-17 PROCEDURE — 3331090002 HH PPS REVENUE DEBIT

## 2021-11-17 PROCEDURE — 3331090001 HH PPS REVENUE CREDIT

## 2021-11-17 NOTE — HOME HEALTH
Subjective: Pt asked about his exercises and pain levels. Pt has difficulty recalling previous instructions regarding exercises and placement of pillows. When given correction of poor habits, pt first reports that he does not recall the previous instructions, then he gives reasons why he can't do said instructions. Future MD appointments: 11/17/21  Caregiver involvement/assistance needed for: Pt lives with spouse on the first floor of a house and she helps him with errands, meals and transportation. Pt asks spouse too quickly for help with simple ADLs and the pt is instructed that he needs to be doing more for himself. Objective: See interventions. Assessment of Progress toward goals: Pt is having difficulty recalling exercses and how to achieve full L knee EXT. Pt is not challenging himself with HEP and AROM. Continued need for the following skills: There is a need for skilled PT to perform therapy based assessment. Communications with all co-treating staff regarding pt status/progress during staff meeting/phone call/text/email. Equipment Needs:  Plan for Next visit: Perform therapy based assessments to determine progress toward goals. Frequency: 3 w 2, 2 w 1. Pt has one remaining visit for impending DC following MD visit. The Following Discharge Planning was Discussed with the Pt/Caregiver: Pt to receive Home Health PT until goals are met or max benefit has been achieved.  He will then transfer to Outpt PT.

## 2021-11-18 ENCOUNTER — HOME CARE VISIT (OUTPATIENT)
Dept: SCHEDULING | Facility: HOME HEALTH | Age: 76
End: 2021-11-18
Payer: MEDICARE

## 2021-11-18 VITALS
DIASTOLIC BLOOD PRESSURE: 74 MMHG | RESPIRATION RATE: 12 BRPM | TEMPERATURE: 97.8 F | SYSTOLIC BLOOD PRESSURE: 124 MMHG | HEART RATE: 87 BPM | OXYGEN SATURATION: 98 %

## 2021-11-18 PROCEDURE — 3331090002 HH PPS REVENUE DEBIT

## 2021-11-18 PROCEDURE — G0151 HHCP-SERV OF PT,EA 15 MIN: HCPCS

## 2021-11-18 PROCEDURE — 3331090001 HH PPS REVENUE CREDIT

## 2021-11-18 NOTE — HOME HEALTH
SUBJECTIVE: I saw Melany Toribio yesterday. They said that everything is coming along well. REQUIRES CAREGIVER ASSISTANCE FOR: transportation, IADLS   MEDICATIONS REVIEWED AND RECONCILED: no changes   NEXT MD APPT: Dr. Melany Maya in Jan  ROM: L knee in sitting -5-110 degrees  STRENGTH:  L knee hip flexors, quads and hamstrings +4/5   WOUNDS: L knee knee incison dry and intact and open to air no drainage noted no signs or symptoms of infection noted. edges approximated. scabbing noted along the incision line. Swelling noted L knee   BED MOBILITY:supine to sit and sit to supine MOD I  TRANSFERS:sit to stand from kitchen chair x 2 MOD I with B UE support. sit to stand from bed x 2 with and without B UE support MOD I   GAIT: Pt amb household distances with SPC with reciprocal gait pattern MOD I decreased B step length noted. B feet do clear the floor durngs swing phase of gait. Pt has a slow tamara. No balance checks noted with amb. Pt has needed vc to look ahead when amb. Per PTA visit 11/16/21 Pt amb 2 x 150ft with SPC SBA. Pt amb with decreased stride of LLE and he lands on a flat L foot due to a lack of L knee EXT. Pt given VCs as to which hand to hold [facility] and to take bigger steps and to land on the back of his L heel. STAIRS:pt went up and down 4 steps with SPC and 1 hand support on wall with step too gait pattern with S   BALANCE: Pt scored 25/28 on Tinetti Balance Assessment placing pt at low  risk for falls. PATIENT EDUCATION PROVIDED THIS VISIT: safety, HEP, walking, deep breathing, Cont to use SPC at all times when amb for safety    HEP consisting of:   Walking every hour during the day with SPC  Pt instructed in standing exercises and issued handout  HEP: x 15Heel raises, Hip FLEX, Hip ABD, Hamstring curls, Mini squats   Written HEP issued, patient/caregiver verbalized understanding.    Patient is s/p  L TKR and has been treated for ROM, strengthening, gait training, stair training, HEP training, safety training, and balance training. On [facility]  11/3/21 AROM was L knee in sitting -9-95 degrees. Today at [place] AROM is  L knee in sitting -5-110 degrees Pt made progress but did not met goal of 0 degress of extension . On SOC strength was  L hip flexors -3/5 L quads and hamstrings 3/5 L DF/PF 5/5  R hip flexors, quads and hamstrings 5/5 . Today at [place] strength is L knee hip flexors, quads and hamstrings +4/5 . On SOC bed mobility was supine to sit and sit to supine with SBA with MOD vc as pt was instructed to use a belt for L LE managment. After instruction pt demonstated proper technique and sequencing . Today at [place] bed mobility is  L knee knee incison dry and intact and open to air no drainage noted no signs or symptoms of infection noted. edges approximated. scabbing noted along the incsioon . On [facility] transfers were sit to stand from bed x 2 from commode and from kitchen chair with RW with SBA with MIn vc needed for hand placement for safety. Pt extends L LE fwd on all transfers. Today at [place] transfers are sit to stand from kitchen chair x 2 MOD I with B UE support. sit to stand from bed x 2 with and without B UE support MOD I  On SOC gait was Pt amb 30 ft x2 with RW with step too antalgic gait pattern with decreased B step length and decreased L HS at initial contact slow tamara. Decreased L knee flexion during swing phase of gait. Pt has a downward gaze. Pt amb with SBA. Today at [place] gait is  Pt amb household distances with [facility] with reciprocal gait pattern MOD I decreased B step length noted. B feet do clear the floor durngs swing phase of gait. Pt has a slow tamara. No balance checks noted with amb. Pt has needed vc to look ahead when amb. Per PTA visit 11/16/21 Pt amb 2 x 150ft with SPC SBA. Pt amb with decreased stride of LLE and he lands on a flat L foot due to a lack of L knee EXT. Pt given VCs as to which hand to hold [facility] and to take bigger steps and to land on the back of his L heel. .On [facility] Pt scored 13/28 on Tinetti Balance Assessment placing pt at high risk for falls. .  [facility] scored a 25/28 on Tinetti Balance Assessment placing pt as a low fall risk. Pt did have more than 4 point statistical imrprovement  Pt has made  progress and has met all goals. Discharge plan: Discharge from [facility] services as all goals have been met.  Dr. Trip Lange office notified of DC from [facility] with goal met

## 2021-11-18 NOTE — Clinical Note
Patient is s/p  L TKR and has been treated for ROM, strengthening, gait training, stair training, HEP training, safety training, and balance training. On [facility]  11/3/21 AROM was L knee in sitting -9-95 degrees. Today at [place] AROM is  L knee in sitting -5-110 degrees Pt made progress but did not met goal of 0 degress of extension . On SOC strength was  L hip flexors -3/5 L quads and hamstrings 3/5 L DF/PF 5/5  R hip flexors, quads and hamstrings 5/5 . Today at [place] strength is L knee hip flexors, quads and hamstrings +4/5 . On SOC bed mobility was supine to sit and sit to supine with SBA with MOD vc as pt was instructed to use a belt for L LE managment. After instruction pt demonstated proper technique and sequencing . Today at [place] bed mobility is  L knee knee incison dry and intact and open to air no drainage noted no signs or symptoms of infection noted. edges approximated. scabbing noted along the incsioon . On [facility] transfers were sit to stand from bed x 2 from commode and from kitchen chair with RW with SBA with MIn vc needed for hand placement for safety. Pt extends L LE fwd on all transfers. Today at [facility] transfers are sit to stand from kitchen chair x 2 MOD I with B UE support. sit to stand from bed x 2 with and without B UE support MOD I  On SOC gait was Pt amb 30 ft x2 with RW with step too antalgic gait pattern with decreased B step length and decreased L HS at initial contact slow tamara. Decreased L knee flexion during swing phase of gait. Pt has a downward gaze. Pt amb with SBA. Today at [place] gait is  Pt amb household distances with [facility] with reciprocal gait pattern MOD I decreased B step length noted. B feet do clear the floor durngs swing phase of gait. Pt has a slow tamara. No balance checks noted with amb. Pt has needed vc to look ahead when amb. Per PTA visit 11/16/21 Pt amb 2 x 150ft with SPC SBA. Pt amb with decreased stride of LLE and he lands on a flat L foot due to a lack of L knee EXT.  Pt given VCs as to which hand to hold [facility] and to take bigger steps and to land on the back of his L heel. .On [facility] Pt scored 13/28 on Tinetti Balance Assessment placing pt at high risk for falls. .  Today at [place] pt scored a 25/28 on Tinetti Balance Assessment placing pt as a low fall risk. Pt did have more than 4 point statistical imrprovement  Pt has made  progress and has met all goals. Discharge plan: Discharge from [facility] services as all goals have been met.  Dr. Severiano Byers office notified of DC from [facility] with goal met

## 2022-01-12 ENCOUNTER — HOSPITAL ENCOUNTER (OUTPATIENT)
Dept: PREADMISSION TESTING | Age: 77
Discharge: HOME | End: 2022-01-12

## 2022-01-13 ENCOUNTER — TRANSCRIBE ORDER (OUTPATIENT)
Dept: REGISTRATION | Age: 77
End: 2022-01-13

## 2022-01-13 ENCOUNTER — HOSPITAL ENCOUNTER (OUTPATIENT)
Dept: PREADMISSION TESTING | Age: 77
Discharge: HOME | End: 2022-01-13
Payer: MEDICARE

## 2022-01-13 VITALS — BODY MASS INDEX: 21.66 KG/M2 | WEIGHT: 130 LBS | HEIGHT: 65 IN

## 2022-01-13 DIAGNOSIS — M24.662 ANKYLOSIS OF LEFT KNEE: Primary | ICD-10-CM

## 2022-01-13 DIAGNOSIS — Z01.812 BLOOD TESTS PRIOR TO TREATMENT OR PROCEDURE: Primary | ICD-10-CM

## 2022-01-13 DIAGNOSIS — Z01.812 BLOOD TESTS PRIOR TO TREATMENT OR PROCEDURE: ICD-10-CM

## 2022-01-13 DIAGNOSIS — M24.662 ANKYLOSIS OF LEFT KNEE: ICD-10-CM

## 2022-01-13 LAB
ALBUMIN SERPL-MCNC: 3.6 G/DL (ref 3.4–5)
ALBUMIN/GLOB SERPL: 1.1 {RATIO} (ref 0.8–1.7)
ALP SERPL-CCNC: 114 U/L (ref 45–117)
ALT SERPL-CCNC: 17 U/L (ref 16–61)
ANION GAP SERPL CALC-SCNC: 5 MMOL/L (ref 3–18)
AST SERPL-CCNC: 13 U/L (ref 10–38)
ATRIAL RATE: 84 BPM
BASOPHILS # BLD: 0 K/UL (ref 0–0.1)
BASOPHILS NFR BLD: 0 % (ref 0–2)
BILIRUB SERPL-MCNC: 0.4 MG/DL (ref 0.2–1)
BUN SERPL-MCNC: 31 MG/DL (ref 7–18)
BUN/CREAT SERPL: 22 (ref 12–20)
CALCIUM SERPL-MCNC: 9.2 MG/DL (ref 8.5–10.1)
CALCULATED P AXIS, ECG09: 78 DEGREES
CALCULATED R AXIS, ECG10: 44 DEGREES
CALCULATED T AXIS, ECG11: 59 DEGREES
CHLORIDE SERPL-SCNC: 106 MMOL/L (ref 100–111)
CO2 SERPL-SCNC: 29 MMOL/L (ref 21–32)
CREAT SERPL-MCNC: 1.39 MG/DL (ref 0.6–1.3)
DIAGNOSIS, 93000: NORMAL
DIFFERENTIAL METHOD BLD: ABNORMAL
EOSINOPHIL # BLD: 0.2 K/UL (ref 0–0.4)
EOSINOPHIL NFR BLD: 2 % (ref 0–5)
ERYTHROCYTE [DISTWIDTH] IN BLOOD BY AUTOMATED COUNT: 14.2 % (ref 11.6–14.5)
EST. AVERAGE GLUCOSE BLD GHB EST-MCNC: 154 MG/DL
GLOBULIN SER CALC-MCNC: 3.3 G/DL (ref 2–4)
GLUCOSE SERPL-MCNC: 105 MG/DL (ref 74–99)
HBA1C MFR BLD: 7 % (ref 4.2–5.6)
HCT VFR BLD AUTO: 41.2 % (ref 36–48)
HGB BLD-MCNC: 13 G/DL (ref 13–16)
IMM GRANULOCYTES # BLD AUTO: 0 K/UL (ref 0–0.04)
IMM GRANULOCYTES NFR BLD AUTO: 0 % (ref 0–0.5)
LYMPHOCYTES # BLD: 1 K/UL (ref 0.9–3.6)
LYMPHOCYTES NFR BLD: 14 % (ref 21–52)
MCH RBC QN AUTO: 29.7 PG (ref 24–34)
MCHC RBC AUTO-ENTMCNC: 31.6 G/DL (ref 31–37)
MCV RBC AUTO: 94.1 FL (ref 78–100)
MONOCYTES # BLD: 0.8 K/UL (ref 0.05–1.2)
MONOCYTES NFR BLD: 11 % (ref 3–10)
NEUTS SEG # BLD: 5.3 K/UL (ref 1.8–8)
NEUTS SEG NFR BLD: 73 % (ref 40–73)
NRBC # BLD: 0 K/UL (ref 0–0.01)
NRBC BLD-RTO: 0 PER 100 WBC
P-R INTERVAL, ECG05: 154 MS
PLATELET # BLD AUTO: 235 K/UL (ref 135–420)
PMV BLD AUTO: 10.2 FL (ref 9.2–11.8)
POTASSIUM SERPL-SCNC: 4.3 MMOL/L (ref 3.5–5.5)
PROT SERPL-MCNC: 6.9 G/DL (ref 6.4–8.2)
Q-T INTERVAL, ECG07: 368 MS
QRS DURATION, ECG06: 86 MS
QTC CALCULATION (BEZET), ECG08: 434 MS
RBC # BLD AUTO: 4.38 M/UL (ref 4.35–5.65)
SARS-COV-2, NAA: NOT DETECTED
SODIUM SERPL-SCNC: 140 MMOL/L (ref 136–145)
VENTRICULAR RATE, ECG03: 84 BPM
WBC # BLD AUTO: 7.3 K/UL (ref 4.6–13.2)

## 2022-01-13 PROCEDURE — 93005 ELECTROCARDIOGRAM TRACING: CPT

## 2022-01-13 PROCEDURE — 80053 COMPREHEN METABOLIC PANEL: CPT

## 2022-01-13 PROCEDURE — 36415 COLL VENOUS BLD VENIPUNCTURE: CPT

## 2022-01-13 PROCEDURE — 85025 COMPLETE CBC W/AUTO DIFF WBC: CPT

## 2022-01-13 PROCEDURE — 83036 HEMOGLOBIN GLYCOSYLATED A1C: CPT

## 2022-01-13 PROCEDURE — U0003 INFECTIOUS AGENT DETECTION BY NUCLEIC ACID (DNA OR RNA); SEVERE ACUTE RESPIRATORY SYNDROME CORONAVIRUS 2 (SARS-COV-2) (CORONAVIRUS DISEASE [COVID-19]), AMPLIFIED PROBE TECHNIQUE, MAKING USE OF HIGH THROUGHPUT TECHNOLOGIES AS DESCRIBED BY CMS-2020-01-R: HCPCS

## 2022-01-13 RX ORDER — SODIUM CHLORIDE, SODIUM LACTATE, POTASSIUM CHLORIDE, CALCIUM CHLORIDE 600; 310; 30; 20 MG/100ML; MG/100ML; MG/100ML; MG/100ML
125 INJECTION, SOLUTION INTRAVENOUS CONTINUOUS
Status: CANCELLED | OUTPATIENT
Start: 2022-01-13

## 2022-01-13 RX ORDER — CEFAZOLIN SODIUM/WATER 2 G/20 ML
2 SYRINGE (ML) INTRAVENOUS ONCE
Status: CANCELLED | OUTPATIENT
Start: 2022-01-17 | End: 2022-01-17

## 2022-01-13 RX ORDER — ACETAMINOPHEN 500 MG
1000 TABLET ORAL
Status: CANCELLED | OUTPATIENT
Start: 2022-01-13

## 2022-01-13 RX ORDER — LISINOPRIL 5 MG/1
2.5 TABLET ORAL DAILY
Status: CANCELLED | OUTPATIENT
Start: 2022-01-13

## 2022-01-13 NOTE — PERIOP NOTES
Leave all valuables at home or loved ones;to include wallets/purse, money/credit cards, electronics  such as laptops and tablets. If you want to have your prescriptions filled here, please have some form of payment with your . Please arrange for your transportation home Denies any prosthetics. Patient states that the family physician is not aware of upcoming procedure. Do not put any lotion, jewelry, makeup, fingernail or toenail polish; no wigs, no private piercings; no tictac,gum and mouthwash. Denies sleep apnea. Please be aware that due to unforeseen circumstances, delays may occur and your patience will be appreciated. If you ae scheduled to be discharged the same day, please plan to be with us for most of the day. If an inpatient, room assignments may be delayed as well. Our priority is to make you as comfortable as possible and to keep your family informed of your status when possible. Denies family history of anesthesia complications. Denies shortness of breath nor chest pain while climbing stairs.  No dnr. covid done 1-13-22

## 2022-01-13 NOTE — H&P
[id number] [facility]  History and Physical Exam    Patient: Stuart Robles MRN: [id number]  SSN: [id number]    YOB: 1945  Age: 64 y.o. Sex: male      Subjective:      Chief Complaint: Left knee pain    History of Present Illness:  Patient complains of pain and stiffness to the left knee and difficulty ambulating, which has progressively worsened over several months. He is status post left tka from 11/2/2021. X-rays showed tka components of the joint. The patient's pain has persisted and progressed despite conservative treatments and therapies. The patient has been previously treated with nsaids. The patient has at this time opted for further intervention. Past Medical History:   Diagnosis Date    Arthritis     Arthrofibrosis of knee joint, left 1/13/2022    Back pain     Bladder stone     BPH with obstruction/lower urinary tract symptoms     Cancer ([facility] [address] 2013    skin cancer neck     Chronic pain     hip    Colon polyp     Diabetes (HCC)     diet controlled and exercise    ED (erectile dysfunction)     Elevated PSA     Erectile dysfunction     Gout     States in his hand and recently on medication.  Hydrocele     Hypertension     Lower urinary tract symptoms (LUTS)     Nocturia     Osteoarthritis of left hip 12/19/2012    Osteoarthritis of right hip 2/1/2016    PONV (postoperative nausea and vomiting)     Primary localized osteoarthritis of left knee 10/28/2021    Urinary frequency     UTI (urinary tract infection)     States he is on medication and does not remember the name. He is going to contact Dr. Kathe Baugh for questions. Past Surgical History:   Procedure Laterality Date    HX COLONOSCOPY  01/09/2017    HX HIP REPLACEMENT Left     HX HIP REPLACEMENT Right 2/2/16    HX KNEE ARTHROSCOPY Left 2005    HX LAP CHOLECYSTECTOMY  12/3/15    HX MOHS PROCEDURES Left 2009    HX UROLOGICAL  07/24/2007    PNBx. Benign.      HX UROLOGICAL 01/02/2018    [facility]. Cystoscopy with Large Bladder stones Cystolithopaxy and PVP. Dr. Pancho Hassan. Social History     Occupational History    Not on file   Tobacco Use    Smoking status: Never Smoker    Smokeless tobacco: Never Used   Substance and Sexual Activity    Alcohol use: No    Drug use: No    Sexual activity: Never     Prior to Admission medications    Medication Sig Start Date End Date Taking? Authorizing Provider   acetaminophen (Tylenol Extra Strength) 500 mg tablet Take 1,000 mg by mouth every eight (8) hours as needed for Fever or Pain. Provider, Historical   glimepiride (AMARYL) 1 mg tablet Take 0.5 mg by mouth Daily (before breakfast). Provider, Historical   lisinopril (PRINIVIL, ZESTRIL) 10 mg tablet Take 2.5 mg by mouth daily. Provider, Historical       Allergies: Allergies   Allergen Reactions    Other Food Runny Nose     Mono sodium gluconate    Oxycodone-Acetaminophen Other (comments)     Other reaction(s): gi distress        Review of Systems:  A comprehensive review of systems was negative except for that written in the History of Present Illness. Objective:       Physical Exam:  HEENT: Normocephalic, atraumatic  Lungs:  Clear to auscultation  Heart:   Regular rate and rhythm  Abdomen: Soft  Extremities:  Pain with range of motion of the left knee. Active extension decreased, active flexion decreased   Tenderness generalized. No deformity. No effusion. Positive crepitus. Antalgic gait. Assessment:      Arthrofibrosis of the left knee s/p tka. Plan:       Proceed with scheduled LEFT KNEE GAYLA    The various methods of treatment have been discussed with the patient and family. After consideration of risks, benefits, and other options for treatment, the patient has consented to further interventions. Questions were answered and preoperative teaching was done by Dr Zenaida Graham.      Signed By: BERENICE Jim     January 13, 2022

## 2022-01-17 ENCOUNTER — HOSPITAL ENCOUNTER (OUTPATIENT)
Age: 77
Setting detail: OUTPATIENT SURGERY
Discharge: HOME | End: 2022-01-17
Attending: ORTHOPAEDIC SURGERY | Admitting: ORTHOPAEDIC SURGERY
Payer: MEDICARE

## 2022-01-17 ENCOUNTER — ANESTHESIA EVENT (OUTPATIENT)
Dept: SURGERY | Age: 77
End: 2022-01-17
Payer: MEDICARE

## 2022-01-17 ENCOUNTER — ANESTHESIA (OUTPATIENT)
Dept: SURGERY | Age: 77
End: 2022-01-17
Payer: MEDICARE

## 2022-01-17 VITALS
OXYGEN SATURATION: 100 % | HEIGHT: 65 IN | TEMPERATURE: 97.9 F | RESPIRATION RATE: 16 BRPM | HEART RATE: 71 BPM | DIASTOLIC BLOOD PRESSURE: 65 MMHG | WEIGHT: 129.4 LBS | SYSTOLIC BLOOD PRESSURE: 161 MMHG | BODY MASS INDEX: 21.56 KG/M2

## 2022-01-17 DIAGNOSIS — M24.662 ARTHROFIBROSIS OF KNEE JOINT, LEFT: Primary | Chronic | ICD-10-CM

## 2022-01-17 LAB
GLUCOSE BLD STRIP.AUTO-MCNC: 154 MG/DL (ref 70–110)
GLUCOSE BLD STRIP.AUTO-MCNC: 92 MG/DL (ref 70–110)

## 2022-01-17 PROCEDURE — 76060000031 HC ANESTHESIA FIRST 0.5 HR: Performed by: ORTHOPAEDIC SURGERY

## 2022-01-17 PROCEDURE — 76210000063 HC OR PH I REC FIRST 0.5 HR: Performed by: ORTHOPAEDIC SURGERY

## 2022-01-17 PROCEDURE — 76010000154 HC OR TIME FIRST 0.5 HR: Performed by: ORTHOPAEDIC SURGERY

## 2022-01-17 PROCEDURE — 77030016060 HC NDL NRV BLK TELE -A: Performed by: ANESTHESIOLOGY

## 2022-01-17 PROCEDURE — 77030020782 HC GWN BAIR PAWS FLX 3M -B: Performed by: ORTHOPAEDIC SURGERY

## 2022-01-17 PROCEDURE — 74011250637 HC RX REV CODE- 250/637: Performed by: ANESTHESIOLOGY

## 2022-01-17 PROCEDURE — 74011250636 HC RX REV CODE- 250/636: Performed by: NURSE ANESTHETIST, CERTIFIED REGISTERED

## 2022-01-17 PROCEDURE — 64450 NJX AA&/STRD OTHER PN/BRANCH: CPT | Performed by: ANESTHESIOLOGY

## 2022-01-17 PROCEDURE — 74011636637 HC RX REV CODE- 636/637: Performed by: ANESTHESIOLOGY

## 2022-01-17 PROCEDURE — L1830 KO IMMOB CANVAS LONG PRE OTS: HCPCS | Performed by: ORTHOPAEDIC SURGERY

## 2022-01-17 PROCEDURE — 82962 GLUCOSE BLOOD TEST: CPT

## 2022-01-17 PROCEDURE — 74011250636 HC RX REV CODE- 250/636: Performed by: ANESTHESIOLOGY

## 2022-01-17 PROCEDURE — 74011250636 HC RX REV CODE- 250/636: Performed by: ORTHOPAEDIC SURGERY

## 2022-01-17 PROCEDURE — 64447 NJX AA&/STRD FEMORAL NRV IMG: CPT | Performed by: ANESTHESIOLOGY

## 2022-01-17 PROCEDURE — 76942 ECHO GUIDE FOR BIOPSY: CPT | Performed by: ORTHOPAEDIC SURGERY

## 2022-01-17 PROCEDURE — 76210000021 HC REC RM PH II 0.5 TO 1 HR: Performed by: ORTHOPAEDIC SURGERY

## 2022-01-17 RX ORDER — HYDROMORPHONE HYDROCHLORIDE 2 MG/1
2 TABLET ORAL
Qty: 60 TABLET | Refills: 0 | Status: SHIPPED | OUTPATIENT
Start: 2022-01-17 | End: 2022-01-24

## 2022-01-17 RX ORDER — ONDANSETRON 2 MG/ML
4 INJECTION INTRAMUSCULAR; INTRAVENOUS ONCE
Status: DISCONTINUED | OUTPATIENT
Start: 2022-01-17 | End: 2022-01-17 | Stop reason: HOSPADM

## 2022-01-17 RX ORDER — INSULIN LISPRO 100 [IU]/ML
INJECTION, SOLUTION INTRAVENOUS; SUBCUTANEOUS
Status: COMPLETED | OUTPATIENT
Start: 2022-01-17 | End: 2022-01-17

## 2022-01-17 RX ORDER — SODIUM CHLORIDE, SODIUM LACTATE, POTASSIUM CHLORIDE, CALCIUM CHLORIDE 600; 310; 30; 20 MG/100ML; MG/100ML; MG/100ML; MG/100ML
100 INJECTION, SOLUTION INTRAVENOUS CONTINUOUS
Status: DISCONTINUED | OUTPATIENT
Start: 2022-01-17 | End: 2022-01-17 | Stop reason: HOSPADM

## 2022-01-17 RX ORDER — ROPIVACAINE HYDROCHLORIDE 5 MG/ML
INJECTION, SOLUTION EPIDURAL; INFILTRATION; PERINEURAL AS NEEDED
Status: DISCONTINUED | OUTPATIENT
Start: 2022-01-17 | End: 2022-01-17 | Stop reason: HOSPADM

## 2022-01-17 RX ORDER — FLUMAZENIL 0.1 MG/ML
0.2 INJECTION INTRAVENOUS
Status: DISCONTINUED | OUTPATIENT
Start: 2022-01-17 | End: 2022-01-17 | Stop reason: HOSPADM

## 2022-01-17 RX ORDER — ACETAMINOPHEN 500 MG
1000 TABLET ORAL ONCE
Status: COMPLETED | OUTPATIENT
Start: 2022-01-17 | End: 2022-01-17

## 2022-01-17 RX ORDER — CELECOXIB 100 MG/1
400 CAPSULE ORAL
Status: COMPLETED | OUTPATIENT
Start: 2022-01-17 | End: 2022-01-17

## 2022-01-17 RX ORDER — FENTANYL CITRATE 50 UG/ML
INJECTION, SOLUTION INTRAMUSCULAR; INTRAVENOUS AS NEEDED
Status: DISCONTINUED | OUTPATIENT
Start: 2022-01-17 | End: 2022-01-17 | Stop reason: HOSPADM

## 2022-01-17 RX ORDER — PROPOFOL 10 MG/ML
INJECTION, EMULSION INTRAVENOUS AS NEEDED
Status: DISCONTINUED | OUTPATIENT
Start: 2022-01-17 | End: 2022-01-17 | Stop reason: HOSPADM

## 2022-01-17 RX ORDER — GUAIFENESIN 100 MG/5ML
81 LIQUID (ML) ORAL 2 TIMES DAILY
Qty: 42 TABLET | Refills: 0 | Status: SHIPPED | OUTPATIENT
Start: 2022-01-17 | End: 2022-02-07

## 2022-01-17 RX ORDER — HYDROMORPHONE HYDROCHLORIDE 2 MG/1
2 TABLET ORAL
Qty: 60 TABLET | Refills: 0 | Status: SHIPPED | OUTPATIENT
Start: 2022-01-17 | End: 2022-01-17 | Stop reason: SDUPTHER

## 2022-01-17 RX ORDER — FENTANYL CITRATE 50 UG/ML
50 INJECTION, SOLUTION INTRAMUSCULAR; INTRAVENOUS
Status: DISCONTINUED | OUTPATIENT
Start: 2022-01-17 | End: 2022-01-17 | Stop reason: HOSPADM

## 2022-01-17 RX ORDER — SODIUM CHLORIDE, SODIUM LACTATE, POTASSIUM CHLORIDE, CALCIUM CHLORIDE 600; 310; 30; 20 MG/100ML; MG/100ML; MG/100ML; MG/100ML
125 INJECTION, SOLUTION INTRAVENOUS CONTINUOUS
Status: DISCONTINUED | OUTPATIENT
Start: 2022-01-17 | End: 2022-01-17 | Stop reason: HOSPADM

## 2022-01-17 RX ORDER — MIDAZOLAM HYDROCHLORIDE 1 MG/ML
INJECTION, SOLUTION INTRAMUSCULAR; INTRAVENOUS AS NEEDED
Status: DISCONTINUED | OUTPATIENT
Start: 2022-01-17 | End: 2022-01-17 | Stop reason: HOSPADM

## 2022-01-17 RX ORDER — CEFAZOLIN SODIUM/WATER 2 G/20 ML
2 SYRINGE (ML) INTRAVENOUS ONCE
Status: DISCONTINUED | OUTPATIENT
Start: 2022-01-17 | End: 2022-01-17

## 2022-01-17 RX ORDER — NALOXONE HYDROCHLORIDE 0.4 MG/ML
0.4 INJECTION, SOLUTION INTRAMUSCULAR; INTRAVENOUS; SUBCUTANEOUS AS NEEDED
Status: DISCONTINUED | OUTPATIENT
Start: 2022-01-17 | End: 2022-01-17 | Stop reason: HOSPADM

## 2022-01-17 RX ADMIN — ROPIVACAINE HYDROCHLORIDE 20 ML: 5 INJECTION, SOLUTION EPIDURAL; INFILTRATION; PERINEURAL at 07:50

## 2022-01-17 RX ADMIN — CELECOXIB 400 MG: 100 CAPSULE ORAL at 07:10

## 2022-01-17 RX ADMIN — PROPOFOL 50 MG: 10 INJECTION, EMULSION INTRAVENOUS at 08:07

## 2022-01-17 RX ADMIN — MIDAZOLAM 2 MG: 1 INJECTION INTRAMUSCULAR; INTRAVENOUS at 07:48

## 2022-01-17 RX ADMIN — SODIUM CHLORIDE, POTASSIUM CHLORIDE, SODIUM LACTATE AND CALCIUM CHLORIDE 125 ML/HR: 600; 310; 30; 20 INJECTION, SOLUTION INTRAVENOUS at 08:35

## 2022-01-17 RX ADMIN — ROPIVACAINE HYDROCHLORIDE 10 ML: 5 INJECTION, SOLUTION EPIDURAL; INFILTRATION; PERINEURAL at 07:53

## 2022-01-17 RX ADMIN — ACETAMINOPHEN 1000 MG: 500 TABLET ORAL at 07:10

## 2022-01-17 RX ADMIN — PROPOFOL 20 MG: 10 INJECTION, EMULSION INTRAVENOUS at 08:08

## 2022-01-17 RX ADMIN — FENTANYL CITRATE 50 MCG: 50 INJECTION, SOLUTION INTRAMUSCULAR; INTRAVENOUS at 07:48

## 2022-01-17 RX ADMIN — SODIUM CHLORIDE, POTASSIUM CHLORIDE, SODIUM LACTATE AND CALCIUM CHLORIDE 125 ML/HR: 600; 310; 30; 20 INJECTION, SOLUTION INTRAVENOUS at 06:34

## 2022-01-17 RX ADMIN — INSULIN LISPRO 2 UNITS: 100 INJECTION, SOLUTION INTRAVENOUS; SUBCUTANEOUS at 07:10

## 2022-01-17 NOTE — ANESTHESIA PROCEDURE NOTES
Peripheral Block    Start time: 1/17/2022 7:48 AM  End time: 1/17/2022 7:54 AM  Performed by: Red Kelly DO  Authorized by: Red Kelly DO       Pre-procedure: Indications: at surgeon's request and post-op pain management    Preanesthetic Checklist: patient identified, risks and benefits discussed, site marked, timeout performed, anesthesia consent given and patient being monitored    Timeout Time: 07:48 EST          Block Type:   Block Type:   Adductor canal block  Laterality:  Left  Monitoring:  Standard ASA monitoring, continuous pulse ox, frequent vital sign checks, heart rate, oxygen and responsive to questions  Injection Technique:  Single shot  Procedures: ultrasound guided    Patient Position: supine (frog leg)  Location:  Mid thigh  Needle Type:  Stimuplex  Needle Gauge:  21 G  Needle Localization:  Ultrasound guidance    Assessment:  Number of attempts:  1  Injection Assessment:  Incremental injection every 5 mL, local visualized surrounding nerve on ultrasound, negative aspiration for blood, no paresthesia, no intravascular symptoms and ultrasound image on chart  Patient tolerance:  Patient tolerated the procedure well with no immediate complications

## 2022-01-17 NOTE — INTERVAL H&P NOTE
Update History & Physical    The Patient's History and Physical of January 13, 2022 was reviewed with the patient and I examined the patient. There was no change. The surgical site was confirmed by the patient and me. Plan:  The risk, benefits, expected outcome, and alternative to the recommended procedure have been discussed with the patient. Patient understands and wants to proceed with the procedure.     Electronically signed by Jai Mills MD on 1/17/2022 at 7:11 AM

## 2022-01-17 NOTE — PERIOP NOTES
Report to Sherif Marte RN ready for phase 2 care, pain well controlled and vital signs with in limits.

## 2022-01-17 NOTE — ANESTHESIA PREPROCEDURE EVALUATION
Relevant Problems   No relevant active problems       Anesthetic History     PONV   Pertinent negatives: No increased risk of difficult airway, pseudocholinesterase deficiency, malignant hyperthermia and history of awareness of surgery under anesthesia       Review of Systems / Medical History  Patient summary reviewed, nursing notes reviewed and pertinent labs reviewed    Pulmonary  Within defined limits                 Neuro/Psych   Within defined limits           Cardiovascular    Hypertension: well controlled            Pertinent negatives: No past MI, CAD, PAD, dysrhythmias, angina and CHF  Exercise tolerance: >4 METS     GI/Hepatic/Renal  Within defined limits              Endo/Other    Diabetes    Arthritis  Pertinent negatives: No hypothyroidism, hyperthyroidism, obesity and blood dyscrasia   Other Findings              Physical Exam    Airway  Mallampati: III  TM Distance: 4 - 6 cm  Neck ROM: normal range of motion   Mouth opening: Normal     Cardiovascular  Regular rate and rhythm,  S1 and S2 normal,  no murmur, click, rub, or gallop             Dental  No notable dental hx       Pulmonary  Breath sounds clear to auscultation               Abdominal  GI exam deferred       Other Findings            Anesthetic Plan    ASA: 2  Anesthesia type: general and regional - femoral single shot and IPACK block          Induction: Intravenous  Anesthetic plan and risks discussed with: Patient and Spouse      Brief GA with adductor canal and IPAK blocks for postop pain control

## 2022-01-17 NOTE — PERIOP NOTES
TRANSFER - IN REPORT:    Verbal report received from ORN & CRNA on Vedia Pole  being received from OR (unit) for routine post - op      Report consisted of patients Situation, Background, Assessment and   Recommendations(SBAR). Information from the following report(s) SBAR was reviewed with the receiving nurse. Opportunity for questions and clarification was provided. Assessment completed upon patients arrival to unit and care assumed.

## 2022-01-17 NOTE — PERIOP NOTES
Discharge instructions reviewed with patient and family. Opportunity for questions and answers given. No further questions at this time. Pt asking multiple questions regarding PT. Pts wife verbalizes understanding - pt to start PT today and continue as ordered.

## 2022-01-17 NOTE — DISCHARGE INSTRUCTIONS
[address] [place] [address] Sports Medicine   Patient Discharge Instructions    Jerry Robert / [id number] : 1945    Admitted (Not on file) Discharged: 2022     IF YOU HAVE ANY PROBLEMS ONCE YOU ARE  [facility]:   Main office number: [phone]    Your follow up appointment to see either Dr. Catrina Sebastian PA-C, or [facility] JOSE as scheduled in 2 weeks. If you are unsure of your appointment date call the office at [phone]. Medication Instructions     · Resume your home medictions as directed, you may have directed not to resume supplements until after your follow up. · A prescription for pain medication has been given   · It is important that you take the medication exactly as they are prescribed. · Keep your medication in the bottles provided by the pharmacist and keep a list of the medication names, dosages, and times to be taken in your wallet. · Do not take other medications without consulting your doctor. What to do at [address] [place] [address] Ave your prehospital diet. If you have excessive nausea or vomitting call your doctor's office. Be sure to maintain adequate fluid intake. Some pain medications may cause constipation. Remember to drink fluids, stay as active as possible, and eat plenty of fiber-rich foods. Begin outpatient Physical Therapy; 5 times a week to work on gait training, range of motion, strengthening, and weight bearing exercises as tolerable. Continue complete weight total bearing as tolerable. Patient may shower. When to Call    - Call if you have a temperature greater then 101  - Unable to keep food down  - Are unable to bear any wieght   - Need a pain medication refill     Information obtained by :  I understand that if any problems occur once I am at home I am to contact my physician. I understand and acknowledge receipt of the instructions indicated above. Physician's or R.N.'s Signature                                                                  Date/Time                                                                                                                                              Patient or Representative Signature                                                          Date/Time        DISCHARGE SUMMARY from Nurse    PATIENT INSTRUCTIONS:    After general anesthesia or intravenous sedation, for 24 hours or while taking prescription Narcotics:  · Limit your activities  · Do not drive and operate hazardous machinery  · Do not make important personal or business decisions  · Do  not drink alcoholic beverages  · If you have not urinated within 8 hours after discharge, please contact your surgeon on call. Report the following to your surgeon:  · Excessive pain, swelling, redness or odor of or around the surgical area  · Temperature over 100.5  · Nausea and vomiting lasting longer than 4 hours or if unable to take medications  · Any signs of decreased circulation or nerve impairment to extremity: change in color, persistent  numbness, tingling, coldness or increase pain  · Any questions    What to do at Home:  Recommended activity: Ambulate in house and No lifting, Driving, or Strenuous exercise until advised,     If you experience any of the following symptoms above, please follow up with Dr. Edin Bernal. *  Please give a list of your current medications to your Primary Care Provider. *  Please update this list whenever your medications are discontinued, doses are      changed, or new medications (including over-the-counter products) are added. *  Please carry medication information at all times in case of emergency situations.     These are general instructions for a healthy lifestyle:    No smoking/ No tobacco products/ Avoid exposure to second hand smoke  Surgeon General's Warning:  Quitting smoking now greatly reduces serious risk to your health. Obesity, smoking, and sedentary lifestyle greatly increases your risk for illness    A healthy diet, regular physical exercise & weight monitoring are important for maintaining a healthy lifestyle    You may be retaining fluid if you have a history of heart failure or if you experience any of the following symptoms:  Weight gain of 3 pounds or more overnight or 5 pounds in a week, increased swelling in our hands or feet or shortness of breath while lying flat in bed. Please call your doctor as soon as you notice any of these symptoms; do not wait until your next office visit. Patient armband removed and shredded    The discharge information has been reviewed with the patient and caregiver. The patient and caregiver verbalized understanding. Discharge medications reviewed with the patient and caregiver and appropriate educational materials and side effects teaching were provided. Learning About COVID-19 and Social Distancing  What is it? Social distancing means putting space between yourself and other people. The recommended distance is 6 feet, or about 2 meters. This also means staying away from any place where people may gather, such as ceja or other public gathering places. Why is it important? Social distancing is the best way to reduce the spread of COVID-19. This virus seems to spread from person to person through droplets from coughing and sneezing. So if you keep your distance from others, you're less likely to get it or spread it. And social distancing is important for everyone, not just those who are at high risk of infection, like older people. You might have the virus but not have symptoms. You could then give the infection to someone you come into contact with. How is it done? Putting 6 feet, or about 2 meters, between you and other people is the recommended distance.  Also stay away from any place where people may gather, such as ceja or other public gathering places. So if possible:  · Work from home, and keep your kids at home. · Don't travel if you don't have to. And avoid public transportation, ride-shares, and taxis unless you have no choice. · Limit shopping to essentials, like food and medicines. · Wear a cloth face cover if you have to go to a public place like the grocery store or pharmacy. · Don't eat in restaurants. (You can still get takeout or food deliveries.)  · Avoid crowds and busy places. Follow stay-at-home orders or other directions for your area. Where can you learn more? Go to http://www.gray.com/  Enter A133 in the search box to learn more about \"Learning About COVID-19 and Social Distancing. \"  Current as of: December 18, 2020               Content Version: 12.8  © 9651-3174 [employer], [facility]. Care instructions adapted under license by [employer] (which disclaims liability or warranty for this information). If you have questions about a medical condition or this instruction, always ask your healthcare professional. [address] any warranty or liability for your use of this information.     ___________________________________________________________________________________________________________________________________

## 2022-01-17 NOTE — ANESTHESIA POSTPROCEDURE EVALUATION
Post-Anesthesia Evaluation & Assessment    Visit Vitals  BP (!) 146/57 (BP 1 Location: Left upper arm, BP Patient Position: At rest)   Pulse 88   Temp 36.4 °C (97.6 °F)   Resp 11   Ht 5' 5\" (1.651 m)   Wt 58.7 kg (129 lb 6.4 oz)   SpO2 100%   BMI 21.53 kg/m²       Nausea/Vomiting: Controlled. Post-operative hydration adequate. Pain Scale 1: Visual (01/17/22 0815)  Pain Intensity 1: 0 (01/17/22 0815)   Managed    Pain score at or below stated goal level. Mental status & Level of consciousness: alert and oriented x 3    Neurological status: moves all extremities, sensation grossly intact    Pulmonary status: airway patent, adequate oxygenation. Complications related to anesthesia: none    Patient has met all PACU discharge requirements.       Mary Thurman DO

## 2022-01-17 NOTE — DISCHARGE SUMMARY
[address] [facility] [address]     DISCHARGE SUMMARY     PATIENT: Genesis Hernandes     MRN: [id number]   ADMIT DATE: 2022   BILLIN   DISCHARGE DATE: 2022     ATTENDING: Chun Swanson MD   DICTATING: BERENICE Kirk         ADMISSION DIAGNOSIS: ARTHROFIBROSIS OF LEFT KNEE    DISCHARGE DIAGNOSIS: Status post LEFT KNEE GAYLA    HISTORY OF PRESENT ILLNESS: The patient is a 64y.o. year-old male   with ongoing left knee pain secondary to arthrofibrosis of his left knee s/p tka. The patient's pain has persisted and progressed despite conservative treatments and therapies. The patient has at this time opted for surgical intervention. PAST MEDICAL HISTORY:   Past Medical History:   Diagnosis Date    Arthritis     Arthrofibrosis of knee joint, left 2022    Back pain     Bladder stone     BPH with obstruction/lower urinary tract symptoms     Cancer ([facility] [address]     skin cancer neck     Chronic pain     hip    Colon polyp     Diabetes ([facility] [address]     diet controlled and exercise; late     ED (erectile dysfunction)     Elevated PSA     Erectile dysfunction     Gout     States in his hand and recently on medication.  Hydrocele     Hypertension     1990s    Lower urinary tract symptoms (LUTS)     Nocturia     Osteoarthritis of left hip 2012    Osteoarthritis of right hip 2016    PONV (postoperative nausea and vomiting)     Primary localized osteoarthritis of left knee 10/28/2021    Urinary frequency     UTI (urinary tract infection)     States he is on medication and does not remember the name. He is going to contact Dr. Jace Crawley for questions.        PAST SURGICAL HISTORY:   Past Surgical History:   Procedure Laterality Date    HX COLONOSCOPY  2017    HX HIP REPLACEMENT Left     HX HIP REPLACEMENT Right 16    HX KNEE ARTHROSCOPY Left     HX LAP CHOLECYSTECTOMY  12/3/15    HX MOHS PROCEDURES Left 2009    HX UROLOGICAL  07/24/2007    PNBx. Benign.  HX UROLOGICAL  01/02/2018    [facility]. Cystoscopy with Large Bladder stones Cystolithopaxy and PVP. Dr. Vargas Morin.  PA TOTAL HIP ARTHROPLASTY Bilateral        ALLERGIES:   Allergies   Allergen Reactions    Other Food Runny Nose     Mono sodium gluconate    Prednisone Anaphylaxis and Palpitations     Hard to breathe    Oxycodone-Acetaminophen Other (comments)     Other reaction(s): gi distress  ** ABLE TO take tylenol w/out issues        CURRENT MEDICATIONS:  A list of medications prior to the time of admission include:  Prior to Admission medications    Medication Sig Start Date End Date Taking? Authorizing Provider   aspirin 81 mg chewable tablet Take 1 Tablet by mouth two (2) times a day for 21 days. 1/17/22 2/7/22 Yes Nicolás Velasco PA   HYDROmorphone (Dilaudid) 2 mg tablet Take 1 Tablet by mouth every four (4) hours as needed for Pain for up to 7 days. Max Daily Amount: 12 mg. 1/17/22 1/24/22 Yes Nicolás Velasco PA   acetaminophen (Tylenol Extra Strength) 500 mg tablet Take 1,000 mg by mouth every eight (8) hours as needed for Fever or Pain. Yes Provider, Historical   glimepiride (AMARYL) 1 mg tablet Take 0.5 mg by mouth Daily (before breakfast). Indications: not consistent use   Yes Provider, Historical   lisinopril (PRINIVIL, ZESTRIL) 10 mg tablet Take 5 mg by mouth daily. Indications: not consistent intake   Yes Provider, Historical       FAMILY HISTORY: History reviewed. No pertinent family history. SOCIAL HISTORY:   Social History     Socioeconomic History    Marital status:    Tobacco Use    Smoking status: Never Smoker    Smokeless tobacco: Never Used   Vaping Use    Vaping Use: Never used   Substance and Sexual Activity    Alcohol use: No    Drug use: No    Sexual activity: Never       REVIEW OF SYSTEMS: All review of systems are negative.     PHYSICAL EXAMINATION: For a detailed physical exam, please refer to the patient's chart.    HOSPITAL COURSE: The patient was taken to surgery the day of admission. he underwent a left knee serena. Operative course was benign. Estimated blood loss was approximately 0 cc. The patient was discharged home in stable condition. DISCHARGE INSTRUCTIONS: The patient is to be discharged home. he is to continue on his prior medications per the medication reconciliation form, to which we will add:  1. Aspirin 81 mg; 1 tablet po bid x 21 days  2. Dilaudid 2 mg; 1 tablets p.o. every 4 hours p.r.n. for pain    The patient is to continue outpatient physical therapy 5 times a week to work on gait training, range of motion, strengthening, and weightbearing exercises as tolerated on his left lower extremity. The patient is to continue total weightbearing as tolerable. The patient is to followup with Dr. Dena Flores, Harish French PA-C and/or Billy Chang PA-C in the office approximately 7-10 days status post for further evaluation.     Juan Martinez [address], [place]  7/87/49426:01 PM

## 2022-01-17 NOTE — ANESTHESIA PROCEDURE NOTES
Peripheral Block    Start time: 1/17/2022 7:48 AM  End time: 1/17/2022 7:54 AM  Performed by: Gwen Soriano DO  Authorized by: Gwen Soriano DO       Pre-procedure:    Indications: at surgeon's request and post-op pain management    Preanesthetic Checklist: patient identified, risks and benefits discussed, site marked, timeout performed, anesthesia consent given and patient being monitored    Timeout Time: 07:48 EST          Block Type:   Block Type:  IPACK  Laterality:  Left  Monitoring:  Standard ASA monitoring, continuous pulse ox, frequent vital sign checks, heart rate, oxygen and responsive to questions  Injection Technique:  Single shot  Procedures: nerve stimulator    Patient Position: supine  Prep: chlorhexidine    Location:  Lower thigh (lower lateral thigh)  Needle Type:  Stimuplex  Needle Gauge:  21 G  Needle Localization:  Ultrasound guidance    Assessment:  Number of attempts:  1  Injection Assessment:  Incremental injection every 5 mL, local visualized surrounding nerve on ultrasound, negative aspiration for blood, no intravascular symptoms, no paresthesia and ultrasound image on chart  Patient tolerance:  Patient tolerated the procedure well with no immediate complications

## 2022-01-17 NOTE — PERIOP NOTES
Pt. Used restroom in pre-op area with assistance. Patient placed on Melissa Paws for a minimum of 30 min in  Preop.

## 2022-01-18 NOTE — OP NOTES
[facility]  OPERATIVE REPORT    Name:  Salima Barajas  MR#:   [id number]  :  1945  ACCOUNT #:  [de-identified]  DATE OF SERVICE:  2022    PREOPERATIVE DIAGNOSIS:  Arthrofibrosis status post left total knee arthroplasty. POSTOPERATIVE DIAGNOSIS:  Arthrofibrosis status post left total knee arthroplasty. PROCEDURE PERFORMED:  Manipulation of the left knee under anesthesia. SURGEON:  Xiao Devi MD    ASSISTANT:  None. ANESTHESIA:  Regional with IV sedation. COMPLICATIONS:  None. SPECIMENS REMOVED:  None. IMPLANTS:  none    ESTIMATED BLOOD LOSS:  Minimal.    FINDINGS:  Arthrofibrosis of the left knee. PROCEDURE:  After the patient was brought into the operating suite, he was left supine on the stretcher. He had been anesthetized in the preop holding area with an adductor and iPACK nerve blocks. After he was given IV sedation, a proper time-out was performed. His knee was examined, and it was interesting in that before he was given any kind of analgesia, he had about 15-degree flexion contracture. After just giving adequate analgesia without even manipulating his knee, he went to extension to just shy of 5 degrees. At this point, he was then manipulated with palpable and audible lysis of adhesions; and post manipulation, had range of motion from essentially 0 to 104 degrees of flexion with no instability detected. He tolerated this well. He was taken to the recovery room extubated in stable condition. All sponge and needle counts were correct.       MD RUDDY [email or web address]  D:  2022 13:08  T:  2022 19:50  JOB #:  [id number]

## 2024-04-23 NOTE — H&P
[address] [facility] [address] Medicine  History and Physical Exam    Patient: Sindi Herrera MRN: [id number]  SSN: [id number]    YOB: 1945  Age: 66 y.o. Sex: male      Subjective:      Chief Complaint: Left knee pain    History of Present Illness:  Patient complains of pain to the left knee and difficulty ambulating, which has progressively worsened over several months. X-rays showed osteoarthritis of the joint. The patient's pain has persisted and progressed despite conservative treatments and therapies. The patient has been previously treated with nsaids. The patient has at this time opted for surgical intervention. Past Medical History:   Diagnosis Date    Arthritis     Back pain     Bladder stone     BPH with obstruction/lower urinary tract symptoms     Cancer ([facility] [address] 2013    skin cancer neck     Chronic pain     hip    Colon polyp     Diabetes ([facility] [address]     diet controlled and exercise    ED (erectile dysfunction)     Elevated PSA     Erectile dysfunction     Gout     States in his hand and recently on medication.  Hydrocele     Hypertension     Lower urinary tract symptoms (LUTS)     Nocturia     Osteoarthritis of left hip 12/19/2012    Osteoarthritis of right hip 2/1/2016    PONV (postoperative nausea and vomiting)     Primary localized osteoarthritis of left knee 10/28/2021    Urinary frequency     UTI (urinary tract infection)     States he is on medication and does not remember the name. He is going to contact Dr. Nichole Gleason for questions. Past Surgical History:   Procedure Laterality Date    HX COLONOSCOPY  01/09/2017    HX HIP REPLACEMENT Left     HX HIP REPLACEMENT Right 2/2/16    HX KNEE ARTHROSCOPY Left 2005    HX LAP CHOLECYSTECTOMY  12/3/15    HX MOHS PROCEDURES Left 2009    HX UROLOGICAL  07/24/2007    PNBx. Benign.  HX UROLOGICAL  01/02/2018    [facility]. Cystoscopy with Large Bladder stones Cystolithopaxy and PVP. Dr. Haseeb Burnham.        Social History     Occupational History    Not on file   Tobacco Use    Smoking status: Never Smoker    Smokeless tobacco: Never Used   Substance and Sexual Activity    Alcohol use: No    Drug use: No    Sexual activity: Never     Prior to Admission medications    Medication Sig Start Date End Date Taking? Authorizing Provider   glimepiride (AMARYL) 1 mg tablet Take 0.5 mg by mouth Daily (before breakfast). Provider, Historical   lisinopril (PRINIVIL, ZESTRIL) 10 mg tablet Take 2.5 mg by mouth daily. Provider, Historical       Allergies: Allergies   Allergen Reactions    Other Food Runny Nose     Mono sodium gluconate    Oxycodone-Acetaminophen Other (comments)     Other reaction(s): gi distress        Review of Systems:  A comprehensive review of systems was negative except for that written in the History of Present Illness. Objective:       Physical Exam:  HEENT: Normocephalic, atraumatic  Lungs:  Clear to auscultation  Heart:   Regular rate and rhythm  Abdomen: Soft  Extremities:  Pain with range of motion of the left knee. Active extension decreased, active flexion decreased   Tenderness generalized. No deformity. No effusion. Positive crepitus. Antalgic gait. Assessment:      Arthritis of the left knee. Plan:       Proceed with scheduled LEFT TOTAL KNEE ARTHROPLASTY. The various methods of treatment have been discussed with the patient and family. After consideration of risks, benefits, and other options for treatment, the patient has consented to surgical interventions. Questions were answered and preoperative teaching was done by Dr Shanita Farr.      Signed By: BERENICE Gonzalez     October 28, 2021 Left arm;

## (undated) DEVICE — SOL INJ L R 1000ML BG --

## (undated) DEVICE — SOLUTION IV 100ML 0.9% SOD CHL DIL INJ

## (undated) DEVICE — Device

## (undated) DEVICE — SOLUTION SCRB 4OZ 10% PVP I POVIDONE IOD TOP PAINT EXIDINE

## (undated) DEVICE — SUT VCRL + 1 36IN CT1 VIO --

## (undated) DEVICE — HANDPIECE SET WITH HIGH FLOW TIP AND SUCTION TUBE: Brand: INTERPULSE

## (undated) DEVICE — ZIP 16 SURGICAL SKIN CLOSURE DEVICE: Brand: ZIP 16 SURGICAL SKIN CLOSURE DEVICE

## (undated) DEVICE — DRSG MEPILES BORDER AG 4X12 -- 5/BX

## (undated) DEVICE — THE CANADY HYBRID PLASMA SCALPEL IS AN ELECTROSURGICAL PLASMA SCALPEL THAT USES AN 85MM BENDABLE PADDLE BLADE TIP. THE ELECTROSURGICAL PLASMA SCALPEL IS USED TO SIMULTANEOUSLY CUT AND COAGULATE BIOLOGICAL TISSUE.: Brand: CANADY HYBRID PLASMA PADDLE BLADE

## (undated) DEVICE — SUT VCRL + 2-0 36IN CT1 UD --

## (undated) DEVICE — SUTURE STRATAFIX SYMMETRIC PDS + 1 SGL ARMED CT 18 IN LEN SXPP1A405

## (undated) DEVICE — 3 BONE CEMENT MIXER: Brand: MIXEVAC

## (undated) DEVICE — SOL IRRIGATION INJ NACL 0.9% 500ML BTL

## (undated) DEVICE — PIN GUIDE FIX 3.2X62 MM SCREW [GS9030620324P] [KOMET MEDICAL]

## (undated) DEVICE — BLADE SAW 1.19X20X90 MM FOR LG BNE

## (undated) DEVICE — NEEDLE SPNL 20GA L3.5IN YEL HUB S STL REG WALL FIT STYL W/

## (undated) DEVICE — NDL SPNE QNCKE 18GX3.5IN LF --

## (undated) DEVICE — IMMOBILIZER KNEE UNIV L19IN FOR 12-24IN THGH FOAM T BAR

## (undated) DEVICE — PIN GUIDE FIX 3.2X62 MM SCREW [GS903A0620322P] [KOMET MEDICAL]

## (undated) DEVICE — BLADE SAW W13XL90MM 1.19MM PARA